# Patient Record
Sex: FEMALE | Race: WHITE | Employment: OTHER | URBAN - METROPOLITAN AREA
[De-identification: names, ages, dates, MRNs, and addresses within clinical notes are randomized per-mention and may not be internally consistent; named-entity substitution may affect disease eponyms.]

---

## 2019-07-15 ENCOUNTER — APPOINTMENT (OUTPATIENT)
Dept: RADIOLOGY | Facility: MEDICAL CENTER | Age: 64
DRG: 603 | End: 2019-07-15
Attending: EMERGENCY MEDICINE
Payer: MEDICARE

## 2019-07-15 ENCOUNTER — APPOINTMENT (OUTPATIENT)
Dept: RADIOLOGY | Facility: MEDICAL CENTER | Age: 64
DRG: 603 | End: 2019-07-15
Attending: HOSPITALIST
Payer: MEDICARE

## 2019-07-15 ENCOUNTER — HOSPITAL ENCOUNTER (INPATIENT)
Facility: MEDICAL CENTER | Age: 64
LOS: 2 days | DRG: 603 | End: 2019-07-17
Attending: EMERGENCY MEDICINE | Admitting: HOSPITALIST
Payer: MEDICARE

## 2019-07-15 DIAGNOSIS — L03.115 CELLULITIS OF RIGHT LOWER EXTREMITY: ICD-10-CM

## 2019-07-15 LAB
ALBUMIN SERPL BCP-MCNC: 4.1 G/DL (ref 3.2–4.9)
ALBUMIN/GLOB SERPL: 1.1 G/DL
ALP SERPL-CCNC: 28 U/L (ref 30–99)
ALT SERPL-CCNC: 14 U/L (ref 2–50)
ANION GAP SERPL CALC-SCNC: 9 MMOL/L (ref 0–11.9)
ANISOCYTOSIS BLD QL SMEAR: ABNORMAL
AST SERPL-CCNC: 17 U/L (ref 12–45)
BASOPHILS # BLD AUTO: 0.5 % (ref 0–1.8)
BASOPHILS # BLD: 0.07 K/UL (ref 0–0.12)
BILIRUB SERPL-MCNC: 0.4 MG/DL (ref 0.1–1.5)
BUN SERPL-MCNC: 18 MG/DL (ref 8–22)
BURR CELLS BLD QL SMEAR: NORMAL
CALCIUM SERPL-MCNC: 9.3 MG/DL (ref 8.5–10.5)
CHLORIDE SERPL-SCNC: 104 MMOL/L (ref 96–112)
CO2 SERPL-SCNC: 25 MMOL/L (ref 20–33)
COMMENT 1642: NORMAL
CREAT SERPL-MCNC: 1.07 MG/DL (ref 0.5–1.4)
EOSINOPHIL # BLD AUTO: 0.19 K/UL (ref 0–0.51)
EOSINOPHIL NFR BLD: 1.3 % (ref 0–6.9)
ERYTHROCYTE [DISTWIDTH] IN BLOOD BY AUTOMATED COUNT: 52.6 FL (ref 35.9–50)
GLOBULIN SER CALC-MCNC: 3.7 G/DL (ref 1.9–3.5)
GLUCOSE SERPL-MCNC: 112 MG/DL (ref 65–99)
HCT VFR BLD AUTO: 42.3 % (ref 37–47)
HGB BLD-MCNC: 12.6 G/DL (ref 12–16)
IMM GRANULOCYTES # BLD AUTO: 0.12 K/UL (ref 0–0.11)
IMM GRANULOCYTES NFR BLD AUTO: 0.8 % (ref 0–0.9)
INR PPP: 1.88 (ref 0.87–1.13)
LACTATE BLD-SCNC: 1.8 MMOL/L (ref 0.5–2)
LYMPHOCYTES # BLD AUTO: 1.08 K/UL (ref 1–4.8)
LYMPHOCYTES NFR BLD: 7.5 % (ref 22–41)
MAGNESIUM SERPL-MCNC: 1.6 MG/DL (ref 1.5–2.5)
MCH RBC QN AUTO: 25 PG (ref 27–33)
MCHC RBC AUTO-ENTMCNC: 29.8 G/DL (ref 33.6–35)
MCV RBC AUTO: 83.8 FL (ref 81.4–97.8)
MICROCYTES BLD QL SMEAR: ABNORMAL
MONOCYTES # BLD AUTO: 0.99 K/UL (ref 0–0.85)
MONOCYTES NFR BLD AUTO: 6.9 % (ref 0–13.4)
MORPHOLOGY BLD-IMP: NORMAL
NEUTROPHILS # BLD AUTO: 11.89 K/UL (ref 2–7.15)
NEUTROPHILS NFR BLD: 83 % (ref 44–72)
NRBC # BLD AUTO: 0 K/UL
NRBC BLD-RTO: 0 /100 WBC
PHOSPHATE SERPL-MCNC: 3.3 MG/DL (ref 2.5–4.5)
PLATELET # BLD AUTO: 317 K/UL (ref 164–446)
PLATELET BLD QL SMEAR: NORMAL
PMV BLD AUTO: 9.5 FL (ref 9–12.9)
POIKILOCYTOSIS BLD QL SMEAR: NORMAL
POTASSIUM SERPL-SCNC: 4.2 MMOL/L (ref 3.6–5.5)
PROT SERPL-MCNC: 7.8 G/DL (ref 6–8.2)
PROTHROMBIN TIME: 22.2 SEC (ref 12–14.6)
RBC # BLD AUTO: 5.05 M/UL (ref 4.2–5.4)
RBC BLD AUTO: PRESENT
SODIUM SERPL-SCNC: 138 MMOL/L (ref 135–145)
WBC # BLD AUTO: 14.3 K/UL (ref 4.8–10.8)

## 2019-07-15 PROCEDURE — 96376 TX/PRO/DX INJ SAME DRUG ADON: CPT

## 2019-07-15 PROCEDURE — 85025 COMPLETE CBC W/AUTO DIFF WBC: CPT

## 2019-07-15 PROCEDURE — 700105 HCHG RX REV CODE 258

## 2019-07-15 PROCEDURE — 85610 PROTHROMBIN TIME: CPT

## 2019-07-15 PROCEDURE — 96365 THER/PROPH/DIAG IV INF INIT: CPT

## 2019-07-15 PROCEDURE — 99285 EMERGENCY DEPT VISIT HI MDM: CPT

## 2019-07-15 PROCEDURE — 83735 ASSAY OF MAGNESIUM: CPT

## 2019-07-15 PROCEDURE — A9270 NON-COVERED ITEM OR SERVICE: HCPCS | Performed by: HOSPITALIST

## 2019-07-15 PROCEDURE — 83605 ASSAY OF LACTIC ACID: CPT

## 2019-07-15 PROCEDURE — 93971 EXTREMITY STUDY: CPT | Mod: RT

## 2019-07-15 PROCEDURE — 700105 HCHG RX REV CODE 258: Performed by: EMERGENCY MEDICINE

## 2019-07-15 PROCEDURE — 80053 COMPREHEN METABOLIC PANEL: CPT

## 2019-07-15 PROCEDURE — 304561 HCHG STAT O2

## 2019-07-15 PROCEDURE — 84100 ASSAY OF PHOSPHORUS: CPT

## 2019-07-15 PROCEDURE — 700111 HCHG RX REV CODE 636 W/ 250 OVERRIDE (IP): Performed by: HOSPITALIST

## 2019-07-15 PROCEDURE — 700111 HCHG RX REV CODE 636 W/ 250 OVERRIDE (IP)

## 2019-07-15 PROCEDURE — 700102 HCHG RX REV CODE 250 W/ 637 OVERRIDE(OP): Performed by: HOSPITALIST

## 2019-07-15 PROCEDURE — 700105 HCHG RX REV CODE 258: Performed by: HOSPITALIST

## 2019-07-15 PROCEDURE — 87040 BLOOD CULTURE FOR BACTERIA: CPT

## 2019-07-15 PROCEDURE — 73590 X-RAY EXAM OF LOWER LEG: CPT | Mod: RT

## 2019-07-15 PROCEDURE — 99223 1ST HOSP IP/OBS HIGH 75: CPT | Performed by: HOSPITALIST

## 2019-07-15 PROCEDURE — 71045 X-RAY EXAM CHEST 1 VIEW: CPT

## 2019-07-15 PROCEDURE — 73560 X-RAY EXAM OF KNEE 1 OR 2: CPT | Mod: LT

## 2019-07-15 PROCEDURE — 770006 HCHG ROOM/CARE - MED/SURG/GYN SEMI*

## 2019-07-15 PROCEDURE — 700111 HCHG RX REV CODE 636 W/ 250 OVERRIDE (IP): Performed by: EMERGENCY MEDICINE

## 2019-07-15 PROCEDURE — 96375 TX/PRO/DX INJ NEW DRUG ADDON: CPT

## 2019-07-15 RX ORDER — MIRTAZAPINE 15 MG/1
7.5 TABLET, FILM COATED ORAL
Status: ON HOLD | COMMUNITY
End: 2022-06-08

## 2019-07-15 RX ORDER — PROPRANOLOL HCL 60 MG
60 CAPSULE, EXTENDED RELEASE 24HR ORAL DAILY
Status: ON HOLD | COMMUNITY
End: 2022-06-06

## 2019-07-15 RX ORDER — ISOSORBIDE MONONITRATE 30 MG/1
30 TABLET, EXTENDED RELEASE ORAL DAILY
Status: DISCONTINUED | OUTPATIENT
Start: 2019-07-16 | End: 2019-07-17 | Stop reason: HOSPADM

## 2019-07-15 RX ORDER — ARIPIPRAZOLE 10 MG/1
10 TABLET ORAL DAILY
Status: DISCONTINUED | OUTPATIENT
Start: 2019-07-16 | End: 2019-07-17 | Stop reason: HOSPADM

## 2019-07-15 RX ORDER — ONDANSETRON 2 MG/ML
4 INJECTION INTRAMUSCULAR; INTRAVENOUS ONCE
Status: COMPLETED | OUTPATIENT
Start: 2019-07-15 | End: 2019-07-15

## 2019-07-15 RX ORDER — SERTRALINE HYDROCHLORIDE 100 MG/1
100 TABLET, FILM COATED ORAL DAILY
Status: DISCONTINUED | OUTPATIENT
Start: 2019-07-16 | End: 2019-07-17 | Stop reason: HOSPADM

## 2019-07-15 RX ORDER — KETOROLAC TROMETHAMINE 30 MG/ML
10 INJECTION, SOLUTION INTRAMUSCULAR; INTRAVENOUS ONCE
Status: COMPLETED | OUTPATIENT
Start: 2019-07-15 | End: 2019-07-15

## 2019-07-15 RX ORDER — FLUTICASONE PROPIONATE 50 MCG
1 SPRAY, SUSPENSION (ML) NASAL DAILY
Status: DISCONTINUED | OUTPATIENT
Start: 2019-07-16 | End: 2019-07-17 | Stop reason: HOSPADM

## 2019-07-15 RX ORDER — POTASSIUM CHLORIDE 20 MEQ/1
20 TABLET, EXTENDED RELEASE ORAL DAILY
Status: DISCONTINUED | OUTPATIENT
Start: 2019-07-16 | End: 2019-07-16

## 2019-07-15 RX ORDER — DOXYCYCLINE 100 MG/1
100 TABLET ORAL EVERY 12 HOURS
Status: DISCONTINUED | OUTPATIENT
Start: 2019-07-15 | End: 2019-07-17 | Stop reason: HOSPADM

## 2019-07-15 RX ORDER — POTASSIUM CHLORIDE 20 MEQ/1
20 TABLET, EXTENDED RELEASE ORAL DAILY
Status: DISCONTINUED | OUTPATIENT
Start: 2019-07-16 | End: 2019-07-15

## 2019-07-15 RX ORDER — ONDANSETRON 2 MG/ML
4 INJECTION INTRAMUSCULAR; INTRAVENOUS EVERY 4 HOURS PRN
Status: DISCONTINUED | OUTPATIENT
Start: 2019-07-15 | End: 2019-07-17 | Stop reason: HOSPADM

## 2019-07-15 RX ORDER — ACETAMINOPHEN 325 MG/1
650 TABLET ORAL EVERY 6 HOURS PRN
Status: DISCONTINUED | OUTPATIENT
Start: 2019-07-15 | End: 2019-07-17 | Stop reason: HOSPADM

## 2019-07-15 RX ORDER — OXYCODONE AND ACETAMINOPHEN 10; 325 MG/1; MG/1
1 TABLET ORAL EVERY 6 HOURS PRN
COMMUNITY

## 2019-07-15 RX ORDER — ARIPIPRAZOLE 10 MG/1
10 TABLET ORAL DAILY
COMMUNITY
End: 2022-10-20

## 2019-07-15 RX ORDER — WARFARIN SODIUM 2 MG/1
2 TABLET ORAL EVERY EVENING
Status: ON HOLD | COMMUNITY
End: 2022-06-08

## 2019-07-15 RX ORDER — FLUTICASONE PROPIONATE 50 MCG
1 SPRAY, SUSPENSION (ML) NASAL DAILY
Status: ON HOLD | COMMUNITY
End: 2022-06-06

## 2019-07-15 RX ORDER — PROMETHAZINE HYDROCHLORIDE 12.5 MG/1
12.5-25 SUPPOSITORY RECTAL EVERY 4 HOURS PRN
Status: DISCONTINUED | OUTPATIENT
Start: 2019-07-15 | End: 2019-07-17 | Stop reason: HOSPADM

## 2019-07-15 RX ORDER — POLYETHYLENE GLYCOL 3350 17 G/17G
1 POWDER, FOR SOLUTION ORAL
Status: DISCONTINUED | OUTPATIENT
Start: 2019-07-15 | End: 2019-07-17 | Stop reason: HOSPADM

## 2019-07-15 RX ORDER — BUDESONIDE AND FORMOTEROL FUMARATE DIHYDRATE 160; 4.5 UG/1; UG/1
2 AEROSOL RESPIRATORY (INHALATION) 2 TIMES DAILY
Status: DISCONTINUED | OUTPATIENT
Start: 2019-07-15 | End: 2019-07-17 | Stop reason: HOSPADM

## 2019-07-15 RX ORDER — FENUGREEK SEED/BL.THISTLE/ANIS 340 MG
1 CAPSULE ORAL EVERY EVENING
Status: DISCONTINUED | OUTPATIENT
Start: 2019-07-15 | End: 2019-07-15

## 2019-07-15 RX ORDER — LEVOTHYROXINE SODIUM 0.1 MG/1
100 TABLET ORAL DAILY
Status: DISCONTINUED | OUTPATIENT
Start: 2019-07-15 | End: 2019-07-17 | Stop reason: HOSPADM

## 2019-07-15 RX ORDER — FUROSEMIDE 40 MG/1
20 TABLET ORAL DAILY
Status: DISCONTINUED | OUTPATIENT
Start: 2019-07-15 | End: 2019-07-16

## 2019-07-15 RX ORDER — ONDANSETRON 4 MG/1
4 TABLET, ORALLY DISINTEGRATING ORAL
Status: ON HOLD | COMMUNITY
End: 2019-07-17

## 2019-07-15 RX ORDER — BENAZEPRIL HYDROCHLORIDE 10 MG/1
40 TABLET ORAL DAILY
Status: DISCONTINUED | OUTPATIENT
Start: 2019-07-15 | End: 2019-07-17 | Stop reason: HOSPADM

## 2019-07-15 RX ORDER — VITAMIN B COMPLEX
1 CAPSULE ORAL EVERY EVENING
Status: DISCONTINUED | OUTPATIENT
Start: 2019-07-15 | End: 2019-07-15

## 2019-07-15 RX ORDER — BISACODYL 10 MG
10 SUPPOSITORY, RECTAL RECTAL
Status: DISCONTINUED | OUTPATIENT
Start: 2019-07-15 | End: 2019-07-17 | Stop reason: HOSPADM

## 2019-07-15 RX ORDER — WARFARIN SODIUM 5 MG/1
5 TABLET ORAL
Status: COMPLETED | OUTPATIENT
Start: 2019-07-15 | End: 2019-07-15

## 2019-07-15 RX ORDER — ONDANSETRON 2 MG/ML
INJECTION INTRAMUSCULAR; INTRAVENOUS
Status: COMPLETED
Start: 2019-07-15 | End: 2019-07-15

## 2019-07-15 RX ORDER — ONDANSETRON 4 MG/1
4 TABLET, ORALLY DISINTEGRATING ORAL EVERY 4 HOURS PRN
Status: DISCONTINUED | OUTPATIENT
Start: 2019-07-15 | End: 2019-07-17 | Stop reason: HOSPADM

## 2019-07-15 RX ORDER — PANTOPRAZOLE SODIUM 40 MG/1
40 TABLET, DELAYED RELEASE ORAL DAILY
Status: DISCONTINUED | OUTPATIENT
Start: 2019-07-15 | End: 2019-07-15

## 2019-07-15 RX ORDER — OMEPRAZOLE 20 MG/1
20 CAPSULE, DELAYED RELEASE ORAL DAILY
Status: DISCONTINUED | OUTPATIENT
Start: 2019-07-16 | End: 2019-07-17 | Stop reason: HOSPADM

## 2019-07-15 RX ORDER — SODIUM CHLORIDE 9 MG/ML
INJECTION, SOLUTION INTRAVENOUS
Status: COMPLETED
Start: 2019-07-15 | End: 2019-07-15

## 2019-07-15 RX ORDER — PROMETHAZINE HYDROCHLORIDE 25 MG/1
12.5-25 TABLET ORAL EVERY 4 HOURS PRN
Status: DISCONTINUED | OUTPATIENT
Start: 2019-07-15 | End: 2019-07-17 | Stop reason: HOSPADM

## 2019-07-15 RX ORDER — OXYCODONE AND ACETAMINOPHEN 10; 325 MG/1; MG/1
1 TABLET ORAL EVERY 6 HOURS PRN
Status: DISCONTINUED | OUTPATIENT
Start: 2019-07-15 | End: 2019-07-16

## 2019-07-15 RX ORDER — MIRTAZAPINE 15 MG/1
15 TABLET, FILM COATED ORAL
Status: DISCONTINUED | OUTPATIENT
Start: 2019-07-15 | End: 2019-07-17 | Stop reason: HOSPADM

## 2019-07-15 RX ORDER — SERTRALINE HYDROCHLORIDE 100 MG/1
100 TABLET, FILM COATED ORAL DAILY
Status: ON HOLD | COMMUNITY
End: 2022-06-06

## 2019-07-15 RX ORDER — SIMVASTATIN 40 MG
20 TABLET ORAL NIGHTLY
Status: DISCONTINUED | OUTPATIENT
Start: 2019-07-15 | End: 2019-07-17 | Stop reason: HOSPADM

## 2019-07-15 RX ORDER — PANTOPRAZOLE SODIUM 40 MG/1
40 TABLET, DELAYED RELEASE ORAL DAILY
Status: ON HOLD | COMMUNITY
End: 2022-06-08

## 2019-07-15 RX ORDER — BUDESONIDE AND FORMOTEROL FUMARATE DIHYDRATE 160; 4.5 UG/1; UG/1
2 AEROSOL RESPIRATORY (INHALATION) 2 TIMES DAILY
Status: ON HOLD | COMMUNITY
End: 2022-06-06

## 2019-07-15 RX ORDER — AMOXICILLIN 250 MG
2 CAPSULE ORAL 2 TIMES DAILY
Status: DISCONTINUED | OUTPATIENT
Start: 2019-07-15 | End: 2019-07-17 | Stop reason: HOSPADM

## 2019-07-15 RX ORDER — PROPRANOLOL HCL 60 MG
60 CAPSULE, EXTENDED RELEASE 24HR ORAL DAILY
Status: DISCONTINUED | OUTPATIENT
Start: 2019-07-16 | End: 2019-07-17 | Stop reason: HOSPADM

## 2019-07-15 RX ADMIN — DOXYCYCLINE 100 MG: 100 TABLET, FILM COATED ORAL at 18:29

## 2019-07-15 RX ADMIN — WARFARIN SODIUM 5 MG: 5 TABLET ORAL at 19:19

## 2019-07-15 RX ADMIN — PROMETHAZINE HYDROCHLORIDE 25 MG: 25 TABLET ORAL at 22:06

## 2019-07-15 RX ADMIN — PIPERACILLIN AND TAZOBACTAM 3.38 G: 3; .375 INJECTION, POWDER, LYOPHILIZED, FOR SOLUTION INTRAVENOUS; PARENTERAL at 19:41

## 2019-07-15 RX ADMIN — AMPICILLIN AND SULBACTAM 3 G: 1; 2 INJECTION, POWDER, FOR SOLUTION INTRAMUSCULAR; INTRAVENOUS at 13:42

## 2019-07-15 RX ADMIN — ONDANSETRON 4 MG: 2 INJECTION INTRAMUSCULAR; INTRAVENOUS at 16:30

## 2019-07-15 RX ADMIN — MIRTAZAPINE 15 MG: 15 TABLET, FILM COATED ORAL at 19:39

## 2019-07-15 RX ADMIN — OXYCODONE AND ACETAMINOPHEN 1 TABLET: 10; 325 TABLET ORAL at 16:49

## 2019-07-15 RX ADMIN — ONDANSETRON 4 MG: 2 INJECTION INTRAMUSCULAR; INTRAVENOUS at 13:42

## 2019-07-15 RX ADMIN — PIPERACILLIN AND TAZOBACTAM 3.38 G: 3; .375 INJECTION, POWDER, LYOPHILIZED, FOR SOLUTION INTRAVENOUS; PARENTERAL at 18:29

## 2019-07-15 RX ADMIN — SIMVASTATIN 20 MG: 40 TABLET, FILM COATED ORAL at 19:39

## 2019-07-15 RX ADMIN — PROMETHAZINE HYDROCHLORIDE 25 MG: 25 TABLET ORAL at 18:29

## 2019-07-15 RX ADMIN — FENTANYL CITRATE 50 MCG: 0.05 INJECTION, SOLUTION INTRAMUSCULAR; INTRAVENOUS at 14:21

## 2019-07-15 RX ADMIN — METOPROLOL TARTRATE 25 MG: 25 TABLET, FILM COATED ORAL at 18:29

## 2019-07-15 RX ADMIN — SODIUM CHLORIDE 500 ML: 9 INJECTION, SOLUTION INTRAVENOUS at 18:30

## 2019-07-15 RX ADMIN — KETOROLAC TROMETHAMINE 9.99 MG: 30 INJECTION, SOLUTION INTRAMUSCULAR at 13:42

## 2019-07-15 RX ADMIN — OXYCODONE AND ACETAMINOPHEN 1 TABLET: 10; 325 TABLET ORAL at 22:06

## 2019-07-15 ASSESSMENT — ENCOUNTER SYMPTOMS
COUGH: 0
ORTHOPNEA: 0
POLYDIPSIA: 0
VOMITING: 1
HEMOPTYSIS: 0
DIZZINESS: 0
MYALGIAS: 1
TINGLING: 0
CHILLS: 0
WEAKNESS: 0
BLOOD IN STOOL: 0
SORE THROAT: 0
HEADACHES: 0
STRIDOR: 0
BLURRED VISION: 0
NERVOUS/ANXIOUS: 1
DEPRESSION: 0
CONSTIPATION: 0
BRUISES/BLEEDS EASILY: 0
FLANK PAIN: 0
SPUTUM PRODUCTION: 0
NECK PAIN: 0
NAUSEA: 1
HEARTBURN: 1
PND: 0
FALLS: 0
WHEEZING: 0
HALLUCINATIONS: 0
SINUS PAIN: 0
CLAUDICATION: 0
TREMORS: 0
ABDOMINAL PAIN: 0
PHOTOPHOBIA: 0
EYE PAIN: 0
DIAPHORESIS: 0
DOUBLE VISION: 0
SHORTNESS OF BREATH: 0
FEVER: 0
BACK PAIN: 0
PALPITATIONS: 0
DIARRHEA: 0

## 2019-07-15 ASSESSMENT — LIFESTYLE VARIABLES: SUBSTANCE_ABUSE: 0

## 2019-07-15 NOTE — H&P
Hospital Medicine History & Physical Note    Date of Service  7/15/2018    Primary Care Physician  Eliza Millard P.A.-C.    Consultants  None    Code Status  Full    Chief Complaint  Leg pain    History of Presenting Illness  64 y.o. female who presented 7/15/2019 with past medical history of obesity, asthma, DVT, hypertension, sleep apnea, diabetes comes to the emergency room for right foot pain that started this morning.  Patient woke up with his pain no severe rating down her legs.  Any pressure on the leg would cause pain.  Describes it as dull and constant.  Patient states that it started turning red but had no visible drainage.  She does state that the leg is itchy and she frequently scratches it.  Last week she had redness in her groin and that was resolved with nystatin powder.  This was associated with nausea and vomiting.  She denies any fever, chills, abdominal pain, chest pain, shortness of breath.  Upon arrival to emergency room with vital signs are within normal limits.  She was placed on 2 L of oxygen.  Right leg x-ray found no acute abnormalities or evidence of osteomyelitis  Bilateral venous Dopplers were negative  Chest x-ray interpreted by me found bilateral atelectasis with no focal infiltrates  Left knee x-ray found loose bodies and infusion      Review of Systems  Review of Systems   Constitutional: Negative for chills, diaphoresis, fever and malaise/fatigue.   HENT: Negative for congestion, ear discharge, ear pain, hearing loss, nosebleeds, sinus pain, sore throat and tinnitus.    Eyes: Negative for blurred vision, double vision, photophobia and pain.   Respiratory: Negative for cough, hemoptysis, sputum production, shortness of breath, wheezing and stridor.    Cardiovascular: Negative for chest pain, palpitations, orthopnea, claudication, leg swelling and PND.   Gastrointestinal: Positive for heartburn, nausea and vomiting. Negative for abdominal pain, blood in stool, constipation, diarrhea  and melena.   Genitourinary: Negative for dysuria, flank pain, frequency, hematuria and urgency.   Musculoskeletal: Positive for joint pain and myalgias. Negative for back pain, falls and neck pain.   Skin: Negative for itching and rash.   Neurological: Negative for dizziness, tingling, tremors, weakness and headaches.   Endo/Heme/Allergies: Negative for environmental allergies and polydipsia. Does not bruise/bleed easily.   Psychiatric/Behavioral: Negative for depression, hallucinations, substance abuse and suicidal ideas. The patient is nervous/anxious.        Past Medical History   has a past medical history of Angina; Anxiety; Arthritis; ASTHMA; Dental disorder; DVT (deep venous thrombosis) ([facility]); HTN (hypertension) (5/24/2011); Morbid obesity ([facility]) (5/24/2011); Psychiatric problem; Unspecified disorder of thyroid; and Unspecified urinary incontinence.    Surgical History   has a past surgical history that includes other abdominal surgery (1979); other abdominal surgery (1990); recovery (5/25/2011); and multiple coronary artery bypass endo vein harvest (5/26/2011).     Family History  family history includes Diabetes in her unknown relative; Heart Attack in her father and sister.     Social History   reports that she quit smoking about 43 years ago. Her smoking use included Cigarettes. She does not have any smokeless tobacco history on file. She reports that she does not drink alcohol or use drugs.    Allergies  Allergies   Allergen Reactions   • Strawberry Unspecified     Bumps on tongue and throat swelling   • Codeine Vomiting and Nausea       Medications  Prior to Admission Medications   Prescriptions Last Dose Informant Patient Reported? Taking?   ARIPiprazole (ABILIFY) 10 MG Tab 7/15/2019 at 0900  Yes Yes   Sig: Take 10 mg by mouth every day.   B Complex Vitamins (B COMPLEX PO) 7/14/2019 at 2000  Yes Yes   Sig: Take 1 Tab by mouth every evening.   BIOTIN PO 7/14/2019 at 2000  Yes Yes   Sig: Take 1 Tab by  mouth every evening.   BLACK COHOSH PO 7/14/2019 at 2000  Yes Yes   Sig: Take 1 Tab by mouth every evening.   Calcium-Vitamin D (CALCIUM + D PO) 7/14/2019 at 2000  Yes No   Sig: Take 1 Tab by mouth every evening.   Misc Natural Products (OSTEO BI-FLEX ADV DOUBLE ST PO) 7/15/2019 at 0900  Yes No   Sig: Take 1 Tab by mouth 2 Times a Day.   Multiple Vitamins-Minerals (ONE-A-DAY WOMENS 50 PLUS PO) 7/15/2019 at 0900  Yes No   Sig: Take 1 Tab by mouth every day.   Probiotic Product (PROBIOTIC PO) 7/14/2019 at 2000  Yes Yes   Sig: Take 1 Cap by mouth every evening.   aspirin 81 MG tablet 7/15/2019 at 0900  Yes No   Sig: Take 81 mg by mouth every day.   benazepril (LOTENSIN) 40 MG tablet 7/15/2019 at 0900  No No   Sig: Take 1 Tab by mouth every day. Needs follow up for future refills   fluticasone (FLONASE) 50 MCG/ACT nasal spray 7/15/2019 at 0900  Yes Yes   Sig: Spray 1 Spray in nose every day.   furosemide (LASIX) 20 MG TABS 7/15/2019 at 0900  Yes No   Sig: Take 20 mg by mouth every day.   isosorbide mononitrate SR (IMDUR) 30 MG TB24 7/15/2019 at 0900  No No   Sig: Take 1 Tab by mouth every day.   levothyroxine (SYNTHROID) 100 MCG Tab 7/15/2019 at 0900  Yes No   Sig: Take 100 mcg by mouth every day.   metoprolol (LOPRESSOR) 25 MG TABS 7/15/2019 at 0900  Yes No   Sig: Take 25 mg by mouth 2 times a day.   mirtazapine (REMERON) 15 MG Tab 7/14/2019 at 2000  Yes Yes   Sig: Take 15 mg by mouth every bedtime.   ondansetron (ZOFRAN ODT) 4 MG TABLET DISPERSIBLE <2 weeks at Unknown time  Yes Yes   Sig: Take 4 mg by mouth 1 time daily as needed for Nausea.   oxyCODONE-acetaminophen (PERCOCET-10)  MG Tab 7/14/2019 at 2200  Yes Yes   Sig: Take 1 Tab by mouth every 6 hours as needed for Severe Pain.   pantoprazole (PROTONIX) 40 MG Tablet Delayed Response 7/15/2019 at 0900  Yes Yes   Sig: Take 40 mg by mouth every day.   potassium chloride (KLOR-CON) 20 MEQ PACK 7/15/2019 at 0900  Yes No   Sig: Take 20 mEq by mouth every day.    propranolol LA (INDERAL LA) 60 MG CAPSULE SR 24 HR 7/15/2019 at 0900  Yes Yes   Sig: Take 60 mg by mouth every day.   sertraline (ZOLOFT) 100 MG Tab 7/15/2019 at 0900  Yes Yes   Sig: Take 100 mg by mouth every day.   simvastatin (ZOCOR) 20 MG TABS 7/14/2019 at 2000  Yes No   Sig: Take 20 mg by mouth every evening.   warfarin (COUMADIN) 3 MG Tab 7/14/2019 at 2000  Yes Yes   Sig: Take 3-4 mg by mouth every evening.      Facility-Administered Medications: None       Physical Exam  Temp:  [36.2 °C (97.1 °F)-37.1 °C (98.8 °F)] 36.3 °C (97.4 °F)  Pulse:  [62-83] 62  Resp:  [16-20] 18  BP: (102-127)/(34-61) 127/34  SpO2:  [91 %-98 %] 93 %    Physical Exam   Constitutional: She is oriented to person, place, and time. She appears well-developed and well-nourished.   HENT:   Head: Normocephalic and atraumatic.   Mouth/Throat: No oropharyngeal exudate.   Eyes: Pupils are equal, round, and reactive to light. EOM are normal. No scleral icterus.   Neck: Normal range of motion. Neck supple. JVD present. No tracheal deviation present. No thyromegaly present.   Cardiovascular: Normal rate, regular rhythm and intact distal pulses.  Exam reveals no gallop and no friction rub.    No murmur heard.  Pulmonary/Chest: Effort normal and breath sounds normal. No stridor. No respiratory distress. She has no wheezes. She has no rales. She exhibits no tenderness.   Abdominal: Soft. Bowel sounds are normal. She exhibits no distension and no mass. There is no tenderness. There is no rebound and no guarding.   Musculoskeletal: Normal range of motion. She exhibits edema.   Right leg redness, warmth, tenderness   Lymphadenopathy:     She has no cervical adenopathy.   Neurological: She is alert and oriented to person, place, and time. She has normal reflexes. No cranial nerve deficit.   Skin: No rash noted. No erythema. No pallor.   Nursing note and vitals reviewed.      Laboratory:  Recent Labs      07/15/19   1232   WBC  14.3*   RBC  5.05    HEMOGLOBIN  12.6   HEMATOCRIT  42.3   MCV  83.8   MCH  25.0*   MCHC  29.8*   RDW  52.6*   PLATELETCT  317   MPV  9.5     Recent Labs      07/15/19   1232   SODIUM  138   POTASSIUM  4.2   CHLORIDE  104   CO2  25   GLUCOSE  112*   BUN  18   CREATININE  1.07   CALCIUM  9.3     Recent Labs      07/15/19   1232   ALTSGPT  14   ASTSGOT  17   ALKPHOSPHAT  28*   TBILIRUBIN  0.4   GLUCOSE  112*     Recent Labs      07/15/19   1232   INR  1.88*             No results for input(s): TROPONINI in the last 72 hours.    Urinalysis:    No results found     Imaging:  DX-KNEE 2- LEFT   Final Result      Postsurgical changes status post left knee arthroplasty.      Degenerative changes.      Trace joint fluid.      Calcification of the distal quadriceps tendon can be seen with calcific tendinopathy.      Anterior loose body.            DX-CHEST-PORTABLE (1 VIEW)   Final Result      Stable cardiomegaly.      Hypoinflation.         US-EXTREMITY VENOUS LOWER UNILAT RIGHT   Final Result      DX-TIBIA AND FIBULA RIGHT   Final Result      No evidence of acute fracture or dislocation.      Prominent calcaneal spurring.      Post surgical changes status post right knee arthroplasty.               Assessment/Plan:  I anticipate this patient will require at least two midnights for appropriate medical management, necessitating inpatient admission.    * Cellulitis of right lower extremity   Assessment & Plan    Redness, warmth, tenderness  Nonseptic lactic acid was normal  Start on IV Zosyn and doxycycline  Follow blood cultures and resolution of redness     History of DVT (deep vein thrombosis)   Assessment & Plan    Continue warfarin with INR goal of 2-3  Current INR is 1.88  Venous Doppler ultrasound were negative     Pulmonary hypertension (HCC)   Assessment & Plan    Likely from obesity  Continue Lasix for volume control     Chronic pain of left knee   Assessment & Plan    X-ray found evidence of loose bodies and effusion  ESR CRP  pending  Ortho consult in a.m.     Hyperglycemia   Assessment & Plan    glucose below 150 in setting of infection  Follow-up on HbA1c level     LALITHA (acute kidney injury) (HCC)   Assessment & Plan    Likely prerenal  IV fluid hydration with normal saline  Monitor BMP and assess response  Avoid IV contrast/nephrotoxins/NSAIDs  Dose adjust meds for decreased GFR         Hypothyroid- (present on admission)   Assessment & Plan    Continue home levothyroxine  Follow-up TSH levels     Dyslipidemia- (present on admission)   Assessment & Plan    Continue current statin dose and follow-up lipid panel     CAD (coronary artery disease)- (present on admission)   Assessment & Plan    Continue aspirin  Continue current statin of 20 mg and follow-up on lipid panel     Morbid obesity (HCC)- (present on admission)   Assessment & Plan    Patient has been counseled on diet and lifestyle modifications  Recommended outpatient weight management program and bariatric surgery evaluation  Pt educated on the increase of morbidity and mortality associated with excess weight including DM, Heart Disease, HTN, stroke, and sleep apnea.  Pt advised weight loss of 5% through reduced calorie, low carb diet and 150 mins of exercise a week       HTN (hypertension)- (present on admission)   Assessment & Plan    Continue current medications including propanolol and metoprolol, Imdur  Patient states that she takes propranolol for tremors         VTE prophylaxis: Warfarin

## 2019-07-15 NOTE — ED PROVIDER NOTES
"ED Provider Note      CHIEF COMPLAINT   Chief Complaint   Patient presents with   • Leg Pain   • Leg Swelling     redness and \"lump\" to lower lateral leg       HPI   Johana Marx is a 64 y.o. female who presents with right leg pain redness and swelling.  Started right lateral lower leg 2 days ago.  Gradually worse.  Sharp character.  Severe when she touches it.  Moved up the side of the leg.  Has pain also in the back of the leg.  She has a history of DVT in the past in the same leg.  She has however anticoagulated with warfarin.  She has not noticed fever.  Has not known of any trauma.    REVIEW OF SYSTEMS   As per HPI, all other systems reviewed and negative    PAST MEDICAL HISTORY   Past Medical History:   Diagnosis Date   • Angina    • Anxiety    • Arthritis     ankles, back, shoulders, knees   • ASTHMA    • Dental disorder     dentures   • DVT (deep venous thrombosis) ([facility])    • HTN (hypertension) 5/24/2011   • Morbid obesity (HCC) 5/24/2011   • Psychiatric problem     depression   • Unspecified disorder of thyroid    • Unspecified urinary incontinence        SOCIAL HISTORY  Social History   Substance Use Topics   • Smoking status: Former Smoker     Types: Cigarettes     Quit date: 5/24/1976   • Smokeless tobacco: Not on file   • Alcohol use No       ALLERGIES   See chart    PHYSICAL EXAM  VITAL SIGNS: /61   Pulse 80   Temp 36.8 °C (98.2 °F) (Temporal)   Resp 16   Ht 1.6 m (5' 3\")   SpO2 92%   BMI 54.56 kg/m²   Head: Atraumatic  Eyes: Eyes normal inspection  Neck: has full range of motion, normal inspection.  Constitutional: No acute distress   Cardiovascular: Normal heart rate. Pulses strong dorsalis pedis  Thorax & Lungs: No respiratory distress  Skin: There is redness warmth and swelling over the right lateral and posterior aspect of the right lower leg.  No palpable cords.  Questionable varicose veins laterally.  No fluctuant abscess identified.  Musculoskeletal: As above.  Tender to " palpation over the area of redness and warmth.  Compartments soft.  No pain with range of motion of the knee or joint effusion.  Neurologic:  Normal sensory and motor    RADIOLOGY/PROCEDURES  US-EXTREMITY VENOUS LOWER UNILAT RIGHT   Final Result      DX-TIBIA AND FIBULA RIGHT   Final Result      No evidence of acute fracture or dislocation.      Prominent calcaneal spurring.      Post surgical changes status post right knee arthroplasty.         DX-KNEE 2- LEFT    (Results Pending)   DX-CHEST-PORTABLE (1 VIEW)    (Results Pending)      Imaging is interpreted by radiologist     Results for orders placed or performed during the hospital encounter of 07/15/19   LACTIC ACID   Result Value Ref Range    Lactic Acid 1.8 0.5 - 2.0 mmol/L   CBC WITH DIFFERENTIAL   Result Value Ref Range    WBC 14.3 (H) 4.8 - 10.8 K/uL    RBC 5.05 4.20 - 5.40 M/uL    Hemoglobin 12.6 12.0 - 16.0 g/dL    Hematocrit 42.3 37.0 - 47.0 %    MCV 83.8 81.4 - 97.8 fL    MCH 25.0 (L) 27.0 - 33.0 pg    MCHC 29.8 (L) 33.6 - 35.0 g/dL    RDW 52.6 (H) 35.9 - 50.0 fL    Platelet Count 317 164 - 446 K/uL    MPV 9.5 9.0 - 12.9 fL    Neutrophils-Polys 83.00 (H) 44.00 - 72.00 %    Lymphocytes 7.50 (L) 22.00 - 41.00 %    Monocytes 6.90 0.00 - 13.40 %    Eosinophils 1.30 0.00 - 6.90 %    Basophils 0.50 0.00 - 1.80 %    Immature Granulocytes 0.80 0.00 - 0.90 %    Nucleated RBC 0.00 /100 WBC    Neutrophils (Absolute) 11.89 (H) 2.00 - 7.15 K/uL    Lymphs (Absolute) 1.08 1.00 - 4.80 K/uL    Monos (Absolute) 0.99 (H) 0.00 - 0.85 K/uL    Eos (Absolute) 0.19 0.00 - 0.51 K/uL    Baso (Absolute) 0.07 0.00 - 0.12 K/uL    Immature Granulocytes (abs) 0.12 (H) 0.00 - 0.11 K/uL    NRBC (Absolute) 0.00 K/uL    Anisocytosis 1+     Microcytosis 1+    COMP METABOLIC PANEL   Result Value Ref Range    Sodium 138 135 - 145 mmol/L    Potassium 4.2 3.6 - 5.5 mmol/L    Chloride 104 96 - 112 mmol/L    Co2 25 20 - 33 mmol/L    Anion Gap 9.0 0.0 - 11.9    Glucose 112 (H) 65 - 99 mg/dL     Bun 18 8 - 22 mg/dL    Creatinine 1.07 0.50 - 1.40 mg/dL    Calcium 9.3 8.5 - 10.5 mg/dL    AST(SGOT) 17 12 - 45 U/L    ALT(SGPT) 14 2 - 50 U/L    Alkaline Phosphatase 28 (L) 30 - 99 U/L    Total Bilirubin 0.4 0.1 - 1.5 mg/dL    Albumin 4.1 3.2 - 4.9 g/dL    Total Protein 7.8 6.0 - 8.2 g/dL    Globulin 3.7 (H) 1.9 - 3.5 g/dL    A-G Ratio 1.1 g/dL   PROTHROMBIN TIME   Result Value Ref Range    PT 22.2 (H) 12.0 - 14.6 sec    INR 1.88 (H) 0.87 - 1.13   ESTIMATED GFR   Result Value Ref Range    GFR If African American >60 >60 mL/min/1.73 m 2    GFR If Non African American 52 (A) >60 mL/min/1.73 m 2   PERIPHERAL SMEAR REVIEW   Result Value Ref Range    Peripheral Smear Review see below    PLATELET ESTIMATE   Result Value Ref Range    Plt Estimation Normal    MORPHOLOGY   Result Value Ref Range    RBC Morphology Present     Poikilocytosis 1+     Echinocytes 1+    DIFFERENTIAL COMMENT   Result Value Ref Range    Comments-Diff see below         COURSE & MEDICAL DECISION MAKING  Patient presents to the ER with right leg pain and swelling.  Has redness warmth and induration over the right lateral leg without evidence of abscess.  Venous duplex was negative.  X-ray was negative.  Laboratory data demonstrates leukocytosis.  Suspect symptoms related to cellulitis.  She was given Unasyn venously.  She had significant pain.  Initially given Toradol.  Since currently given fentanyl.  She was nauseous and given Zofran.  Remained persistently nauseous.  Because of the degree infection as well as her symptoms she will be admitted for IV antibiotics.  I consulted Dr. Sousa for admission.    FINAL IMPRESSION  1.  Cellulitis, right lower extremity      This dictation was created using voice recognition software. The accuracy of the dictation is limited to the abilities of the software. I expect there may be some errors of grammar and possibly content. The nursing notes were reviewed and certain aspects of this information were  incorporated into this note.      Electronically signed by: Tk Sanchez, 7/15/2019 1:00 PM

## 2019-07-15 NOTE — ED TRIAGE NOTES
"Johana Marx  Chief Complaint   Patient presents with   • Leg Pain   • Leg Swelling     redness and \"lump\" to lower lateral leg     Pt to triage in w/c with above complaint.  VSS.  No acute distress noted.  Hx of DVTs, on warfarin.   Pt returned to lobby, educated on triage process, and to inform staff of any changes or concerns.     "

## 2019-07-15 NOTE — ED NOTES
Med rec complete per pt at bedside with list.   Med rec contacted pharmacy about recent fills of medications.  Interviewed pt with family at bedside with permission from pt  Allergies reviewed and updated.

## 2019-07-16 ENCOUNTER — APPOINTMENT (OUTPATIENT)
Dept: RADIOLOGY | Facility: MEDICAL CENTER | Age: 64
DRG: 603 | End: 2019-07-16
Attending: HOSPITALIST
Payer: MEDICARE

## 2019-07-16 PROBLEM — G89.29 CHRONIC PAIN OF LEFT KNEE: Status: ACTIVE | Noted: 2019-07-16

## 2019-07-16 PROBLEM — R73.9 HYPERGLYCEMIA: Status: ACTIVE | Noted: 2019-07-16

## 2019-07-16 PROBLEM — I27.20 PULMONARY HYPERTENSION (HCC): Status: ACTIVE | Noted: 2019-07-16

## 2019-07-16 PROBLEM — R11.0 NAUSEA: Status: ACTIVE | Noted: 2019-07-16

## 2019-07-16 PROBLEM — M25.562 CHRONIC PAIN OF LEFT KNEE: Status: ACTIVE | Noted: 2019-07-16

## 2019-07-16 PROBLEM — Z86.718 HISTORY OF DVT (DEEP VEIN THROMBOSIS): Status: ACTIVE | Noted: 2019-07-16

## 2019-07-16 PROBLEM — N17.9 AKI (ACUTE KIDNEY INJURY) (HCC): Status: ACTIVE | Noted: 2019-07-16

## 2019-07-16 LAB
ALBUMIN SERPL BCP-MCNC: 3.4 G/DL (ref 3.2–4.9)
ALBUMIN/GLOB SERPL: 1 G/DL
ALP SERPL-CCNC: 26 U/L (ref 30–99)
ALT SERPL-CCNC: 11 U/L (ref 2–50)
ANION GAP SERPL CALC-SCNC: 9 MMOL/L (ref 0–11.9)
AST SERPL-CCNC: 15 U/L (ref 12–45)
BASOPHILS # BLD AUTO: 0.5 % (ref 0–1.8)
BASOPHILS # BLD: 0.05 K/UL (ref 0–0.12)
BILIRUB SERPL-MCNC: 0.3 MG/DL (ref 0.1–1.5)
BUN SERPL-MCNC: 26 MG/DL (ref 8–22)
CALCIUM SERPL-MCNC: 8.9 MG/DL (ref 8.5–10.5)
CHLORIDE SERPL-SCNC: 102 MMOL/L (ref 96–112)
CHOLEST SERPL-MCNC: 106 MG/DL (ref 100–199)
CO2 SERPL-SCNC: 24 MMOL/L (ref 20–33)
CREAT SERPL-MCNC: 1.43 MG/DL (ref 0.5–1.4)
CRP SERPL HS-MCNC: 9.19 MG/DL (ref 0–0.75)
EOSINOPHIL # BLD AUTO: 0.26 K/UL (ref 0–0.51)
EOSINOPHIL NFR BLD: 2.4 % (ref 0–6.9)
ERYTHROCYTE [DISTWIDTH] IN BLOOD BY AUTOMATED COUNT: 53.6 FL (ref 35.9–50)
ERYTHROCYTE [SEDIMENTATION RATE] IN BLOOD BY WESTERGREN METHOD: 85 MM/HOUR (ref 0–30)
EST. AVERAGE GLUCOSE BLD GHB EST-MCNC: 123 MG/DL
GLOBULIN SER CALC-MCNC: 3.3 G/DL (ref 1.9–3.5)
GLUCOSE SERPL-MCNC: 114 MG/DL (ref 65–99)
HBA1C MFR BLD: 5.9 % (ref 0–5.6)
HCT VFR BLD AUTO: 36.4 % (ref 37–47)
HDLC SERPL-MCNC: 43 MG/DL
HGB BLD-MCNC: 11.3 G/DL (ref 12–16)
IMM GRANULOCYTES # BLD AUTO: 0.09 K/UL (ref 0–0.11)
IMM GRANULOCYTES NFR BLD AUTO: 0.8 % (ref 0–0.9)
INR PPP: 1.93 (ref 0.87–1.13)
LDLC SERPL CALC-MCNC: 46 MG/DL
LIPASE SERPL-CCNC: 6 U/L (ref 11–82)
LYMPHOCYTES # BLD AUTO: 1.17 K/UL (ref 1–4.8)
LYMPHOCYTES NFR BLD: 10.9 % (ref 22–41)
MCH RBC QN AUTO: 26.2 PG (ref 27–33)
MCHC RBC AUTO-ENTMCNC: 31 G/DL (ref 33.6–35)
MCV RBC AUTO: 84.3 FL (ref 81.4–97.8)
MONOCYTES # BLD AUTO: 0.88 K/UL (ref 0–0.85)
MONOCYTES NFR BLD AUTO: 8.2 % (ref 0–13.4)
NEUTROPHILS # BLD AUTO: 8.26 K/UL (ref 2–7.15)
NEUTROPHILS NFR BLD: 77.2 % (ref 44–72)
NRBC # BLD AUTO: 0 K/UL
NRBC BLD-RTO: 0 /100 WBC
PLATELET # BLD AUTO: 228 K/UL (ref 164–446)
PMV BLD AUTO: 9.4 FL (ref 9–12.9)
POTASSIUM SERPL-SCNC: 4.1 MMOL/L (ref 3.6–5.5)
PROT SERPL-MCNC: 6.7 G/DL (ref 6–8.2)
PROTHROMBIN TIME: 22.6 SEC (ref 12–14.6)
RBC # BLD AUTO: 4.32 M/UL (ref 4.2–5.4)
SODIUM SERPL-SCNC: 135 MMOL/L (ref 135–145)
TRIGL SERPL-MCNC: 84 MG/DL (ref 0–149)
TSH SERPL DL<=0.005 MIU/L-ACNC: 0.7 UIU/ML (ref 0.38–5.33)
WBC # BLD AUTO: 10.7 K/UL (ref 4.8–10.8)

## 2019-07-16 PROCEDURE — 86140 C-REACTIVE PROTEIN: CPT

## 2019-07-16 PROCEDURE — 36415 COLL VENOUS BLD VENIPUNCTURE: CPT

## 2019-07-16 PROCEDURE — 700102 HCHG RX REV CODE 250 W/ 637 OVERRIDE(OP): Performed by: HOSPITALIST

## 2019-07-16 PROCEDURE — 74019 RADEX ABDOMEN 2 VIEWS: CPT

## 2019-07-16 PROCEDURE — 84443 ASSAY THYROID STIM HORMONE: CPT

## 2019-07-16 PROCEDURE — 700111 HCHG RX REV CODE 636 W/ 250 OVERRIDE (IP): Performed by: HOSPITALIST

## 2019-07-16 PROCEDURE — 94760 N-INVAS EAR/PLS OXIMETRY 1: CPT

## 2019-07-16 PROCEDURE — 85610 PROTHROMBIN TIME: CPT

## 2019-07-16 PROCEDURE — 85025 COMPLETE CBC W/AUTO DIFF WBC: CPT

## 2019-07-16 PROCEDURE — 700105 HCHG RX REV CODE 258: Performed by: HOSPITALIST

## 2019-07-16 PROCEDURE — 80053 COMPREHEN METABOLIC PANEL: CPT

## 2019-07-16 PROCEDURE — A9270 NON-COVERED ITEM OR SERVICE: HCPCS | Performed by: HOSPITALIST

## 2019-07-16 PROCEDURE — 85652 RBC SED RATE AUTOMATED: CPT

## 2019-07-16 PROCEDURE — 83690 ASSAY OF LIPASE: CPT

## 2019-07-16 PROCEDURE — 700105 HCHG RX REV CODE 258: Performed by: NURSE PRACTITIONER

## 2019-07-16 PROCEDURE — 99232 SBSQ HOSP IP/OBS MODERATE 35: CPT | Performed by: HOSPITALIST

## 2019-07-16 PROCEDURE — 770006 HCHG ROOM/CARE - MED/SURG/GYN SEMI*

## 2019-07-16 PROCEDURE — 80061 LIPID PANEL: CPT

## 2019-07-16 PROCEDURE — 83036 HEMOGLOBIN GLYCOSYLATED A1C: CPT

## 2019-07-16 RX ORDER — MORPHINE SULFATE 4 MG/ML
2 INJECTION, SOLUTION INTRAMUSCULAR; INTRAVENOUS ONCE
Status: COMPLETED | OUTPATIENT
Start: 2019-07-16 | End: 2019-07-16

## 2019-07-16 RX ORDER — SODIUM CHLORIDE 9 MG/ML
INJECTION, SOLUTION INTRAVENOUS CONTINUOUS
Status: DISCONTINUED | OUTPATIENT
Start: 2019-07-16 | End: 2019-07-16

## 2019-07-16 RX ORDER — WARFARIN SODIUM 4 MG/1
4 TABLET ORAL
Status: DISCONTINUED | OUTPATIENT
Start: 2019-07-16 | End: 2019-07-17 | Stop reason: HOSPADM

## 2019-07-16 RX ORDER — SODIUM CHLORIDE 9 MG/ML
1000 INJECTION, SOLUTION INTRAVENOUS CONTINUOUS
Status: DISCONTINUED | OUTPATIENT
Start: 2019-07-16 | End: 2019-07-17 | Stop reason: HOSPADM

## 2019-07-16 RX ORDER — OXYCODONE AND ACETAMINOPHEN 10; 325 MG/1; MG/1
1 TABLET ORAL EVERY 4 HOURS PRN
Status: DISCONTINUED | OUTPATIENT
Start: 2019-07-16 | End: 2019-07-17 | Stop reason: HOSPADM

## 2019-07-16 RX ADMIN — BENAZEPRIL HYDROCHLORIDE 40 MG: 10 TABLET ORAL at 05:49

## 2019-07-16 RX ADMIN — SERTRALINE HYDROCHLORIDE 100 MG: 100 TABLET ORAL at 05:51

## 2019-07-16 RX ADMIN — LEVOTHYROXINE SODIUM 100 MCG: 100 TABLET ORAL at 05:52

## 2019-07-16 RX ADMIN — POTASSIUM CHLORIDE 20 MEQ: 1500 TABLET, EXTENDED RELEASE ORAL at 05:53

## 2019-07-16 RX ADMIN — ARIPIPRAZOLE 10 MG: 10 TABLET ORAL at 05:48

## 2019-07-16 RX ADMIN — MORPHINE SULFATE 2 MG: 4 INJECTION INTRAVENOUS at 01:26

## 2019-07-16 RX ADMIN — PROMETHAZINE HYDROCHLORIDE 25 MG: 25 TABLET ORAL at 05:47

## 2019-07-16 RX ADMIN — OXYCODONE HYDROCHLORIDE AND ACETAMINOPHEN 1 TABLET: 10; 325 TABLET ORAL at 20:29

## 2019-07-16 RX ADMIN — PIPERACILLIN AND TAZOBACTAM 3.38 G: 3; .375 INJECTION, POWDER, LYOPHILIZED, FOR SOLUTION INTRAVENOUS; PARENTERAL at 05:48

## 2019-07-16 RX ADMIN — FUROSEMIDE 20 MG: 40 TABLET ORAL at 05:52

## 2019-07-16 RX ADMIN — MIRTAZAPINE 15 MG: 15 TABLET, FILM COATED ORAL at 20:29

## 2019-07-16 RX ADMIN — SENNOSIDES, DOCUSATE SODIUM 2 TABLET: 50; 8.6 TABLET, FILM COATED ORAL at 16:40

## 2019-07-16 RX ADMIN — PIPERACILLIN AND TAZOBACTAM 3.38 G: 3; .375 INJECTION, POWDER, LYOPHILIZED, FOR SOLUTION INTRAVENOUS; PARENTERAL at 14:03

## 2019-07-16 RX ADMIN — SENNOSIDES, DOCUSATE SODIUM 2 TABLET: 50; 8.6 TABLET, FILM COATED ORAL at 05:53

## 2019-07-16 RX ADMIN — METOPROLOL TARTRATE 25 MG: 25 TABLET, FILM COATED ORAL at 05:48

## 2019-07-16 RX ADMIN — ACETAMINOPHEN 650 MG: 325 TABLET, FILM COATED ORAL at 08:35

## 2019-07-16 RX ADMIN — BUDESONIDE AND FORMOTEROL FUMARATE DIHYDRATE 2 PUFF: 160; 4.5 AEROSOL RESPIRATORY (INHALATION) at 16:40

## 2019-07-16 RX ADMIN — PROPRANOLOL HYDROCHLORIDE 60 MG: 60 CAPSULE, EXTENDED RELEASE ORAL at 05:52

## 2019-07-16 RX ADMIN — ACETAMINOPHEN 650 MG: 325 TABLET, FILM COATED ORAL at 00:57

## 2019-07-16 RX ADMIN — SIMVASTATIN 20 MG: 40 TABLET, FILM COATED ORAL at 20:30

## 2019-07-16 RX ADMIN — DOXYCYCLINE 100 MG: 100 TABLET, FILM COATED ORAL at 16:40

## 2019-07-16 RX ADMIN — ACETAMINOPHEN 650 MG: 325 TABLET, FILM COATED ORAL at 18:20

## 2019-07-16 RX ADMIN — OXYCODONE HYDROCHLORIDE AND ACETAMINOPHEN 1 TABLET: 10; 325 TABLET ORAL at 11:24

## 2019-07-16 RX ADMIN — SODIUM CHLORIDE 1000 ML: 9 INJECTION, SOLUTION INTRAVENOUS at 16:41

## 2019-07-16 RX ADMIN — DOXYCYCLINE 100 MG: 100 TABLET, FILM COATED ORAL at 05:52

## 2019-07-16 RX ADMIN — OXYCODONE AND ACETAMINOPHEN 1 TABLET: 10; 325 TABLET ORAL at 05:47

## 2019-07-16 RX ADMIN — WARFARIN SODIUM 4 MG: 4 TABLET ORAL at 18:20

## 2019-07-16 RX ADMIN — PROMETHAZINE HYDROCHLORIDE 25 MG: 25 TABLET ORAL at 10:06

## 2019-07-16 RX ADMIN — PIPERACILLIN AND TAZOBACTAM 3.38 G: 3; .375 INJECTION, POWDER, LYOPHILIZED, FOR SOLUTION INTRAVENOUS; PARENTERAL at 20:29

## 2019-07-16 RX ADMIN — PROMETHAZINE HYDROCHLORIDE 25 MG: 25 TABLET ORAL at 16:40

## 2019-07-16 RX ADMIN — ISOSORBIDE MONONITRATE 30 MG: 30 TABLET, EXTENDED RELEASE ORAL at 05:48

## 2019-07-16 RX ADMIN — OMEPRAZOLE 20 MG: 20 CAPSULE, DELAYED RELEASE ORAL at 05:51

## 2019-07-16 RX ADMIN — FLUTICASONE PROPIONATE 50 MCG: 50 SPRAY, METERED NASAL at 05:48

## 2019-07-16 RX ADMIN — ASPIRIN 81 MG: 81 TABLET, COATED ORAL at 05:51

## 2019-07-16 RX ADMIN — BUDESONIDE AND FORMOTEROL FUMARATE DIHYDRATE 2 PUFF: 160; 4.5 AEROSOL RESPIRATORY (INHALATION) at 05:48

## 2019-07-16 ASSESSMENT — ENCOUNTER SYMPTOMS
WEAKNESS: 0
ABDOMINAL PAIN: 0
NAUSEA: 1
DOUBLE VISION: 0
HEADACHES: 0
FEVER: 0
DIZZINESS: 0
PALPITATIONS: 0
CONSTIPATION: 0
COUGH: 0
VOMITING: 0
DIARRHEA: 0
TINGLING: 0
SPEECH CHANGE: 0
CHILLS: 0
SHORTNESS OF BREATH: 0
BLURRED VISION: 0
SENSORY CHANGE: 0
TREMORS: 0
MYALGIAS: 1

## 2019-07-16 ASSESSMENT — COGNITIVE AND FUNCTIONAL STATUS - GENERAL
CLIMB 3 TO 5 STEPS WITH RAILING: A LITTLE
DAILY ACTIVITIY SCORE: 20
DRESSING REGULAR UPPER BODY CLOTHING: A LITTLE
DRESSING REGULAR LOWER BODY CLOTHING: A LITTLE
WALKING IN HOSPITAL ROOM: A LITTLE
TOILETING: A LITTLE
MOBILITY SCORE: 20
SUGGESTED CMS G CODE MODIFIER MOBILITY: CJ
MOVING FROM LYING ON BACK TO SITTING ON SIDE OF FLAT BED: A LITTLE
SUGGESTED CMS G CODE MODIFIER DAILY ACTIVITY: CJ
STANDING UP FROM CHAIR USING ARMS: A LITTLE
HELP NEEDED FOR BATHING: A LITTLE

## 2019-07-16 ASSESSMENT — COPD QUESTIONNAIRES
DO YOU EVER COUGH UP ANY MUCUS OR PHLEGM?: NO/ONLY WITH OCCASIONAL COLDS OR INFECTIONS
COPD SCREENING SCORE: 4
DURING THE PAST 4 WEEKS HOW MUCH DID YOU FEEL SHORT OF BREATH: NONE/LITTLE OF THE TIME
HAVE YOU SMOKED AT LEAST 100 CIGARETTES IN YOUR ENTIRE LIFE: YES

## 2019-07-16 ASSESSMENT — LIFESTYLE VARIABLES: EVER_SMOKED: YES

## 2019-07-16 NOTE — CARE PLAN
Problem: Communication  Goal: The ability to communicate needs accurately and effectively will improve  Outcome: PROGRESSING AS EXPECTED  The pt uses her call light appropriately.     Problem: Knowledge Deficit  Goal: Knowledge of disease process/condition, treatment plan, diagnostic tests, and medications will improve  Outcome: PROGRESSING AS EXPECTED  The pt and family are educated on the plan of care, treatments and medications.

## 2019-07-16 NOTE — DIETARY
"NUTRITION SERVICES: BMI - Pt with BMI >40 (=Body mass index is 50.08 kg/m².), morbid obesity. Weight loss counseling not appropriate in acute care setting.     Consult received for nutrition consult. Per MD note \"Pt educated on the increase of morbidity and mortality associated with excess weight including DM, Heart Disease, HTN, stroke, and sleep apnea.  Pt advised weight loss of 5% through reduced calorie, low carb diet and 150 mins of exercise a week\". Spoke to pt. Pt declined nutrition education on healthy diet and wt loss tips. Pt had no questions regarding nutrition.     RECOMMEND - Referral to outpatient nutrition services for weight management after D/C.     "

## 2019-07-16 NOTE — DISCHARGE PLANNING
Care Transition Team Assessment    NIKITA Wilmer Marx spouse  [phone]    Information Source  Orientation : Oriented x 4  Who is responsible for making decisions for patient? : Patient    Readmission Evaluation  Is this a readmission?: No    Elopement Risk  Legal Hold: No  Ambulatory or Self Mobile in Wheelchair: Yes  Disoriented: No  Psychiatric Symptoms: None  History of Wandering: No  Elopement this Admit: No  Vocalizing Wanting to Leave: No  Displays Behaviors, Body Language Wanting to Leave: No-Not at Risk for Elopement  Elopement Risk: Not at Risk for Elopement         Discharge Preparedness  What is your plan after discharge?: Home with help  What are your discharge supports?: Spouse  Prior Functional Level: Independent with Activities of Daily Living    Functional Assesment  Prior Functional Level: Independent with Activities of Daily Living    Finances  Financial Barriers to Discharge: No  Prescription Coverage: Yes    Vision / Hearing Impairment  Right Eye Vision: Impaired, Wears Glasses  Left Eye Vision: Impaired, Wears Glasses              Domestic Abuse  Have you ever been the victim of abuse or violence?: No    Psychological Assessment  History of Substance Abuse: None  History of Psychiatric Problems: No  Non-compliant with Treatment: No  Newly Diagnosed Illness: No    Discharge Risks or Barriers  Discharge risks or barriers?: No    Anticipated Discharge Information  Anticipated discharge disposition: Home  Discharge Address:  [address]  Discharge Contact Phone Number: [phone]

## 2019-07-16 NOTE — THERAPY
PT eval attempted; pt declined requested PT return later as she did not feel well. Will re-attempt as able.

## 2019-07-16 NOTE — PROGRESS NOTES
[facility] Medicine Daily Progress Note    Date of Service  7/16/2019    Chief Complaint  64 y.o. female admitted 7/15/2019 with right leg pain, swelling and erythema.    Hospital Course    H/O obesity, asthma, DVT, HTN, HIRAM, DM.  Admitted with the above symptoms.  Found to have right lower extremity cellulitis.        Interval Problem Update  7/16:  Swelling and erythema of RLE improving.  Complaining of nausea.  No vomiting.  VSS.  Afebrile.    Consultants/Specialty  N/A    Code Status  FULL    Disposition  Anticipate home at d/c when medically clear.      Review of Systems  Review of Systems   Constitutional: Negative for chills and fever.   HENT: Negative for congestion.    Eyes: Negative for blurred vision and double vision.   Respiratory: Negative for cough and shortness of breath.    Cardiovascular: Negative for chest pain, palpitations and leg swelling.   Gastrointestinal: Positive for nausea. Negative for abdominal pain, constipation, diarrhea and vomiting.   Genitourinary: Negative for dysuria.   Musculoskeletal: Positive for myalgias.   Skin: Negative for itching and rash.   Neurological: Negative for dizziness, tingling, tremors, sensory change, speech change, weakness and headaches.        Physical Exam  Temp:  [36.2 °C (97.1 °F)-37.1 °C (98.8 °F)] 36.4 °C (97.5 °F)  Pulse:  [62-83] 74  Resp:  [18-20] 18  BP: ()/(34-55) 92/55  SpO2:  [91 %-98 %] 92 %    Physical Exam   Constitutional: She is oriented to person, place, and time. She appears well-developed and well-nourished. No distress.   HENT:   Head: Normocephalic and atraumatic.   Mouth/Throat: No oropharyngeal exudate.   Eyes: Conjunctivae are normal. Right eye exhibits no discharge. Left eye exhibits no discharge.   Neck: Normal range of motion. No tracheal deviation present.   Cardiovascular: Normal rate and regular rhythm.    No murmur heard.  Pulmonary/Chest: Effort normal and breath sounds normal. No stridor. No respiratory distress. She  has no wheezes.   Abdominal: Soft. Bowel sounds are normal. She exhibits no distension. There is no tenderness. There is no rebound.   Musculoskeletal: Normal range of motion. She exhibits no deformity.   Neurological: She is alert and oriented to person, place, and time. No cranial nerve deficit.   Skin: Skin is warm and dry. No rash noted. She is not diaphoretic.   RLE swelling and erythema.    Psychiatric: She has a normal mood and affect.   Nursing note and vitals reviewed.      Fluids    Intake/Output Summary (Last 24 hours) at 07/16/19 1555  Last data filed at 07/16/19 0906   Gross per 24 hour   Intake              420 ml   Output                0 ml   Net              420 ml       Laboratory  Recent Labs      07/15/19   1232  07/16/19   0419   WBC  14.3*  10.7   RBC  5.05  4.32   HEMOGLOBIN  12.6  11.3*   HEMATOCRIT  42.3  36.4*   MCV  83.8  84.3   MCH  25.0*  26.2*   MCHC  29.8*  31.0*   RDW  52.6*  53.6*   PLATELETCT  317  228   MPV  9.5  9.4     Recent Labs      07/15/19   1232  07/16/19   0419   SODIUM  138  135   POTASSIUM  4.2  4.1   CHLORIDE  104  102   CO2  25  24   GLUCOSE  112*  114*   BUN  18  26*   CREATININE  1.07  1.43*   CALCIUM  9.3  8.9     Recent Labs      07/15/19   1232  07/16/19   0419   INR  1.88*  1.93*         Recent Labs      07/16/19   0419   TRIGLYCERIDE  84   HDL  43   LDL  46       Imaging  IR-[id number]-4 VIEWS   Final Result      No evidence of bowel obstruction. Nonspecific bowel gas pattern.         DX-KNEE 2- LEFT   Final Result      Postsurgical changes status post left knee arthroplasty.      Degenerative changes.      Trace joint fluid.      Calcification of the distal quadriceps tendon can be seen with calcific tendinopathy.      Anterior loose body.            DX-CHEST-PORTABLE (1 VIEW)   Final Result      Stable cardiomegaly.      Hypoinflation.         US-EXTREMITY VENOUS LOWER UNILAT RIGHT   Final Result      DX-TIBIA AND FIBULA RIGHT   Final Result      No evidence of  acute fracture or dislocation.      Prominent calcaneal spurring.      Post surgical changes status post right knee arthroplasty.              Assessment/Plan  * Cellulitis of right lower extremity   Assessment & Plan    Redness, warmth, tenderness improving.  Continue IV Zosyn and doxycycline       Nausea   Assessment & Plan    ? Secondary to infection  KUB wnl  Treat with anti-emetics.  Diet as tolerated.      History of DVT (deep vein thrombosis)   Assessment & Plan    Continue warfarin with INR goal of 2-3  Venous Doppler ultrasound were negative     Pulmonary hypertension (HCC)   Assessment & Plan    Likely from obesity  Hold lasix secondary to UTI.  Respiratory status stable.      Chronic pain of left knee   Assessment & Plan    X-ray found evidence of loose bodies and effusion  Follow with ortho as outpatient.      Hyperglycemia   Assessment & Plan    glucose below 150 in setting of infection  Hgb A1c 5.9  No need for insulin at this time.     LALITHA (acute kidney injury) (HCC)   Assessment & Plan    Likely prerenal  IV fluid hydration with normal saline  Monitor BMP and assess response  Avoid IV contrast/nephrotoxins/NSAIDs  Dose adjust meds for decreased GFR         Hypothyroid- (present on admission)   Assessment & Plan    Continue home levothyroxine  Follow-up TSH levels     Dyslipidemia- (present on admission)   Assessment & Plan    Continue current statin dose and follow-up lipid panel     CAD (coronary artery disease)- (present on admission)   Assessment & Plan    Continue aspirin  Continue current statin of 20 mg and follow-up on lipid panel     Morbid obesity (HCC)- (present on admission)   Assessment & Plan    Patient has been counseled on diet and lifestyle modifications  Recommended outpatient weight management program and bariatric surgery evaluation  Pt educated on the increase of morbidity and mortality associated with excess weight including DM, Heart Disease, HTN, stroke, and sleep apnea.  Pt  advised weight loss of 5% through reduced calorie, low carb diet and 150 mins of exercise a week       HTN (hypertension)- (present on admission)   Assessment & Plan    Continue current medications including propanolol and metoprolol, Imdur  Patient states that she takes propranolol for tremors          VTE prophylaxis: coumadin.

## 2019-07-16 NOTE — ASSESSMENT & PLAN NOTE
Continue current medications including propanolol and metoprolol, Imdur  Patient states that she takes propranolol for tremors

## 2019-07-16 NOTE — PROGRESS NOTES
Inpatient Anticoagulation Service Note    Date: 7/15/2019    Reason for Anticoagulation: Deep Vein Thrombosis   Target INR: 2.0 to 3.0         Hemoglobin Value: 12.6  Hematocrit Value: 42.3    INR from last 7 days     Date/Time INR Value    07/15/19 1232 (!)  1.88        Dose from last 7 days     Date/Time Dose (mg)    07/15/19 1820  5        Average Dose (mg):  3-4 mg PO daily   Significant Interactions: Aspirin, Antibiotics, Thyroid Medications, Proton Pump Inhibitor  Bridge Therapy: No   Reversal Agent Administered: Not Applicable    Comments: Patient on home warfarin for history of DVT. Per patient, takes 3-4 mg PO warfarin daily. The patient's INR today is subtherapeutic but H/H are stable with no overt sxs of bleeding per chart review.    Plan:   Will give the patient warfarin 5 mg PO daily today and obtain an INR with AM labs. Pharmacy will continue to follow.     Education Material Provided?: No  Pharmacist suggested discharge dosing: TBD based on subsequent INR readings. May resume home dose of warfarin 3-4 mg PO daily with recommended INR check within a week of discharge.      Kasia Saravia, PharmD, BCPS

## 2019-07-16 NOTE — PROGRESS NOTES
Inpatient Anticoagulation Service Note    Date: 7/16/2019    Reason for Anticoagulation: Deep Vein Thrombosis   Target INR: 2.0 to 3.0      Hemoglobin Value: (!) 11.3  Hematocrit Value: (!) 36.4    INR from last 7 days     Date/Time INR Value    07/16/19 0419 (!)  1.93    07/15/19 1232 (!)  1.88        Dose from last 7 days     Date/Time Dose (mg)    07/16/19 1531  4    07/15/19 1820  5        Average Dose (mg):  Per patient: alternates warfarin 3 mg and 4 mg PO nightly  Significant Interactions: Antibiotics, Aspirin, Thyroid Medications, Statin, Proton Pump Inhibitor  Bridge Therapy: No  Reversal Agent Administered: Not Applicable    Comments: Warfarin continued from home for history of DVT. Per patient, she takes warfarin 3 to 4 mg PO nightly. Patient took 3 mg the night PTA. INR slightly sub-therapeutic. H/H stable with no s/s of overt bleeding and dose was given last night. Will schedule out warfarin 4 mg PO daily and monitor with an INR tomorrow AM. DDIs noted. Pharmacy will continue to monitor.    Plan:  Warfarin 4 mg  Education Material Provided?: No (Chronic warfarin patient)  Pharmacist suggested discharge dosing: Warfarin 3 mg PO MWF and 4 mg AOD with close follow-up post-discharge with anticoagulation clinic.    Vicky Hernandez, PharmD

## 2019-07-16 NOTE — PROGRESS NOTES
Patient to room 308 bed 2 from ER. Daughter at bedside. No c/o pain. Oxygen on at 2L. Patient ambulated from wheelchair to bed with stand by assist.

## 2019-07-17 VITALS
BODY MASS INDEX: 50.08 KG/M2 | RESPIRATION RATE: 17 BRPM | DIASTOLIC BLOOD PRESSURE: 67 MMHG | SYSTOLIC BLOOD PRESSURE: 118 MMHG | WEIGHT: 282.63 LBS | TEMPERATURE: 98.1 F | HEART RATE: 71 BPM | HEIGHT: 63 IN | OXYGEN SATURATION: 93 %

## 2019-07-17 LAB
ANION GAP SERPL CALC-SCNC: 7 MMOL/L (ref 0–11.9)
BUN SERPL-MCNC: 23 MG/DL (ref 8–22)
CALCIUM SERPL-MCNC: 8.8 MG/DL (ref 8.5–10.5)
CHLORIDE SERPL-SCNC: 109 MMOL/L (ref 96–112)
CO2 SERPL-SCNC: 24 MMOL/L (ref 20–33)
CREAT SERPL-MCNC: 1.22 MG/DL (ref 0.5–1.4)
ERYTHROCYTE [DISTWIDTH] IN BLOOD BY AUTOMATED COUNT: 54.4 FL (ref 35.9–50)
GLUCOSE SERPL-MCNC: 86 MG/DL (ref 65–99)
HCT VFR BLD AUTO: 37.3 % (ref 37–47)
HGB BLD-MCNC: 11.4 G/DL (ref 12–16)
INR PPP: 2.38 (ref 0.87–1.13)
MCH RBC QN AUTO: 26.1 PG (ref 27–33)
MCHC RBC AUTO-ENTMCNC: 30.6 G/DL (ref 33.6–35)
MCV RBC AUTO: 85.6 FL (ref 81.4–97.8)
PLATELET # BLD AUTO: 250 K/UL (ref 164–446)
PMV BLD AUTO: 9.8 FL (ref 9–12.9)
POTASSIUM SERPL-SCNC: 4.4 MMOL/L (ref 3.6–5.5)
PROTHROMBIN TIME: 26.8 SEC (ref 12–14.6)
RBC # BLD AUTO: 4.36 M/UL (ref 4.2–5.4)
SODIUM SERPL-SCNC: 140 MMOL/L (ref 135–145)
WBC # BLD AUTO: 8.8 K/UL (ref 4.8–10.8)

## 2019-07-17 PROCEDURE — A9270 NON-COVERED ITEM OR SERVICE: HCPCS | Performed by: HOSPITALIST

## 2019-07-17 PROCEDURE — 80048 BASIC METABOLIC PNL TOTAL CA: CPT

## 2019-07-17 PROCEDURE — 85027 COMPLETE CBC AUTOMATED: CPT

## 2019-07-17 PROCEDURE — 700102 HCHG RX REV CODE 250 W/ 637 OVERRIDE(OP): Performed by: HOSPITALIST

## 2019-07-17 PROCEDURE — 700105 HCHG RX REV CODE 258: Performed by: HOSPITALIST

## 2019-07-17 PROCEDURE — 36415 COLL VENOUS BLD VENIPUNCTURE: CPT

## 2019-07-17 PROCEDURE — 700111 HCHG RX REV CODE 636 W/ 250 OVERRIDE (IP): Performed by: HOSPITALIST

## 2019-07-17 PROCEDURE — 85610 PROTHROMBIN TIME: CPT

## 2019-07-17 PROCEDURE — 99239 HOSP IP/OBS DSCHRG MGMT >30: CPT | Performed by: HOSPITALIST

## 2019-07-17 RX ORDER — ONDANSETRON 4 MG/1
4 TABLET, ORALLY DISINTEGRATING ORAL EVERY 4 HOURS PRN
Qty: 10 TAB | Refills: 0 | Status: SHIPPED | OUTPATIENT
Start: 2019-07-17 | End: 2019-07-17

## 2019-07-17 RX ORDER — DOXYCYCLINE 100 MG/1
100 TABLET ORAL EVERY 12 HOURS
Qty: 10 TAB | Refills: 0 | Status: SHIPPED | OUTPATIENT
Start: 2019-07-17 | End: 2019-07-22

## 2019-07-17 RX ORDER — IBUPROFEN 400 MG/1
400 TABLET ORAL EVERY 6 HOURS PRN
Qty: 30 TAB | Refills: 0 | Status: SHIPPED | OUTPATIENT
Start: 2019-07-17 | End: 2019-07-17

## 2019-07-17 RX ORDER — ONDANSETRON 4 MG/1
4 TABLET, ORALLY DISINTEGRATING ORAL EVERY 4 HOURS PRN
Qty: 10 TAB | Refills: 0 | Status: ON HOLD | OUTPATIENT
Start: 2019-07-17 | End: 2022-06-06

## 2019-07-17 RX ORDER — AMOXICILLIN AND CLAVULANATE POTASSIUM 875; 125 MG/1; MG/1
1 TABLET, FILM COATED ORAL 2 TIMES DAILY
Qty: 10 TAB | Refills: 0 | Status: SHIPPED | OUTPATIENT
Start: 2019-07-17 | End: 2019-07-17

## 2019-07-17 RX ORDER — DOXYCYCLINE 100 MG/1
100 TABLET ORAL EVERY 12 HOURS
Qty: 10 TAB | Refills: 0 | Status: SHIPPED | OUTPATIENT
Start: 2019-07-17 | End: 2019-07-17

## 2019-07-17 RX ORDER — FLUCONAZOLE 150 MG/1
150 TABLET ORAL
Qty: 2 TAB | Refills: 0 | Status: ON HOLD | OUTPATIENT
Start: 2019-07-17 | End: 2022-06-06

## 2019-07-17 RX ORDER — AMOXICILLIN AND CLAVULANATE POTASSIUM 875; 125 MG/1; MG/1
1 TABLET, FILM COATED ORAL 2 TIMES DAILY
Qty: 10 TAB | Refills: 0 | Status: SHIPPED | OUTPATIENT
Start: 2019-07-17 | End: 2019-07-22

## 2019-07-17 RX ORDER — IBUPROFEN 400 MG/1
400 TABLET ORAL EVERY 6 HOURS PRN
Qty: 30 TAB | Refills: 0 | Status: ON HOLD | OUTPATIENT
Start: 2019-07-17 | End: 2022-06-06

## 2019-07-17 RX ADMIN — OXYCODONE HYDROCHLORIDE AND ACETAMINOPHEN 1 TABLET: 10; 325 TABLET ORAL at 08:26

## 2019-07-17 RX ADMIN — ARIPIPRAZOLE 10 MG: 10 TABLET ORAL at 06:03

## 2019-07-17 RX ADMIN — OMEPRAZOLE 20 MG: 20 CAPSULE, DELAYED RELEASE ORAL at 06:03

## 2019-07-17 RX ADMIN — DOXYCYCLINE 100 MG: 100 TABLET, FILM COATED ORAL at 06:02

## 2019-07-17 RX ADMIN — BUDESONIDE AND FORMOTEROL FUMARATE DIHYDRATE 2 PUFF: 160; 4.5 AEROSOL RESPIRATORY (INHALATION) at 06:02

## 2019-07-17 RX ADMIN — METOPROLOL TARTRATE 25 MG: 25 TABLET, FILM COATED ORAL at 06:03

## 2019-07-17 RX ADMIN — SERTRALINE HYDROCHLORIDE 100 MG: 100 TABLET ORAL at 06:03

## 2019-07-17 RX ADMIN — FLUTICASONE PROPIONATE 50 MCG: 50 SPRAY, METERED NASAL at 06:02

## 2019-07-17 RX ADMIN — ASPIRIN 81 MG: 81 TABLET, COATED ORAL at 06:03

## 2019-07-17 RX ADMIN — PROPRANOLOL HYDROCHLORIDE 60 MG: 60 CAPSULE, EXTENDED RELEASE ORAL at 06:03

## 2019-07-17 RX ADMIN — ONDANSETRON 4 MG: 4 TABLET, ORALLY DISINTEGRATING ORAL at 08:26

## 2019-07-17 RX ADMIN — PIPERACILLIN AND TAZOBACTAM 3.38 G: 3; .375 INJECTION, POWDER, LYOPHILIZED, FOR SOLUTION INTRAVENOUS; PARENTERAL at 06:02

## 2019-07-17 RX ADMIN — LEVOTHYROXINE SODIUM 100 MCG: 100 TABLET ORAL at 06:02

## 2019-07-17 RX ADMIN — SENNOSIDES, DOCUSATE SODIUM 2 TABLET: 50; 8.6 TABLET, FILM COATED ORAL at 06:02

## 2019-07-17 RX ADMIN — ISOSORBIDE MONONITRATE 30 MG: 30 TABLET, EXTENDED RELEASE ORAL at 06:02

## 2019-07-17 RX ADMIN — BENAZEPRIL HYDROCHLORIDE 40 MG: 10 TABLET ORAL at 06:03

## 2019-07-17 NOTE — PROGRESS NOTES
Patient discharged to home with relative. PIV removed. Discharge instructions given and all questions answered. All belongings with patient.

## 2019-07-17 NOTE — DISCHARGE SUMMARY
"Discharge Summary    CHIEF COMPLAINT ON ADMISSION  Chief Complaint   Patient presents with   • Leg Pain   • Leg Swelling     redness and \"lump\" to lower lateral leg       Reason for Admission  right leg pain and swelling     Admission Date  7/15/2019    CODE STATUS  Full Code    HPI & HOSPITAL COURSE  This is a 64 y.o. Female with a past medical history of obesity, asthma, DVT, hypertension, sleep apnea, diabetes here with complaints of right lower extremity pain, swelling, and erythema.  On admission she was found to have right lower extremity cellulitis.  She was initiated on antibiotic therapy and was monitored in the hospital.  She was also noted to have an acute kidney injury with a mildly elevated creatinine suspect secondary to dehydration.  She was initiated on IV hydration.     She had progressive improvement in her symptoms over her hospital stay.  She as remained afebrile with stable vital signs.  At this time she will be transitioned to oral Augmentin and Doxycyline to complete a 7 day regimen.      Her kidney function is much improved.  She is recommended to maintain daily oral fluid intake of 2 L/day.  She can follow with her PCP for monitoring of her BMP.     The patient also complained of left knee pain, which she reports is chronic.  Further evaluation with x-ray revealed trace joint fluid and possible tendinopathy.  There was no evidence of acute fracture.  She can follow with orthopedics as an outpatient for further evaluation and treatment options.    At this time, the patient's symptoms are much improved and she is medically clear.  She will complete an oral antibiotic regimen and will follow closely with her outpatient physicians.    Therefore, she is discharged in good and stable condition to home with close outpatient follow-up.    The patient met 2-midnight criteria for an inpatient stay at the time of discharge.    Discharge Date  7/17/2019    FOLLOW UP ITEMS POST DISCHARGE  She can follow " with her PCP for repeat BMP in 1 week to follow her creatinine  She is also recommended to follow with orthopedics for further evaluation of her left knee pain.    DISCHARGE DIAGNOSES  Principal Problem:    Cellulitis of right lower extremity POA: Unknown  Active Problems:    HTN (hypertension) POA: Yes    Morbid obesity (HCC) POA: Yes    CAD (coronary artery disease) POA: Yes    Dyslipidemia POA: Yes    Hypothyroid POA: Yes    LALITHA (acute kidney injury) (HCC) POA: Unknown    Hyperglycemia POA: Unknown    Chronic pain of left knee POA: Unknown    Pulmonary hypertension (HCC) POA: Unknown    History of DVT (deep vein thrombosis) POA: Unknown    Nausea POA: Unknown  Resolved Problems:    * No resolved hospital problems. *      FOLLOW UP  She will follow with her PCP in 1-2 weeks.     MEDICATIONS ON DISCHARGE     Medication List      START taking these medications      Instructions   amoxicillin-clavulanate 875-125 MG Tabs  Commonly known as:  AUGMENTIN   Take 1 Tab by mouth 2 times a day for 5 days.  Dose:  1 Tab     doxycycline monohydrate 100 MG tablet  Commonly known as:  ADOXA   Take 1 Tab by mouth every 12 hours for 5 days.  Dose:  100 mg     ibuprofen 400 MG Tabs  Commonly known as:  MOTRIN   Take 1 Tab by mouth every 6 hours as needed for Moderate Pain.  Dose:  400 mg        CHANGE how you take these medications      Instructions   ondansetron 4 MG Tbdp  What changed:  · when to take this  · reasons to take this  Commonly known as:  ZOFRAN ODT   Take 1 Tab by mouth every four hours as needed for Nausea (give PO if no IV route available).  Dose:  4 mg        CONTINUE taking these medications      Instructions   ARIPiprazole 10 MG Tabs  Commonly known as:  ABILIFY   Take 10 mg by mouth every day.  Dose:  10 mg     aspirin 81 MG tablet   Take 81 mg by mouth every day.  Dose:  81 mg     B COMPLEX PO   Take 1 Tab by mouth every evening.  Dose:  1 Tab     benazepril 40 MG tablet  Commonly known as:  LOTENSIN   Take 1  Tab by mouth every day. Needs follow up for future refills  Dose:  40 mg     BIOTIN PO   Take 1 Tab by mouth every evening.  Dose:  1 Tab     BLACK COHOSH PO   Take 1 Tab by mouth every evening.  Dose:  1 Tab     budesonide-formoterol 160-4.5 MCG/ACT Aero  Commonly known as:  SYMBICORT   Inhale 2 Puffs by mouth 2 Times a Day. Sample from doctor  Dose:  2 Puff     CALCIUM + D PO   Take 1 Tab by mouth every evening.  Dose:  1 Tab     fluticasone 50 MCG/ACT nasal spray  Commonly known as:  FLONASE   Spray 1 Spray in nose every day.  Dose:  1 Spray     furosemide 20 MG Tabs  Commonly known as:  LASIX   Take 20 mg by mouth every day.  Dose:  20 mg     isosorbide mononitrate SR 30 MG Tb24  Commonly known as:  IMDUR   Take 1 Tab by mouth every day.  Dose:  30 mg     levothyroxine 100 MCG Tabs  Commonly known as:  SYNTHROID   Take 100 mcg by mouth every day.  Dose:  100 mcg     metoprolol 25 MG Tabs  Commonly known as:  LOPRESSOR   Take 25 mg by mouth 2 times a day.  Dose:  25 mg     mirtazapine 15 MG Tabs  Commonly known as:  REMERON   Take 15 mg by mouth every bedtime.  Dose:  15 mg     ONE-A-DAY WOMENS 50 PLUS PO   Take 1 Tab by mouth every day.  Dose:  1 Tab     OSTEO BI-FLEX ADV DOUBLE ST PO   Take 1 Tab by mouth 2 Times a Day.  Dose:  1 Tab     oxyCODONE-acetaminophen  MG Tabs  Commonly known as:  PERCOCET-10   Take 1 Tab by mouth every 6 hours as needed for Severe Pain.  Dose:  1 Tab     pantoprazole 40 MG Tbec  Commonly known as:  PROTONIX   Take 40 mg by mouth every day.  Dose:  40 mg     potassium chloride 20 MEQ Pack  Commonly known as:  KLOR-CON   Take 20 mEq by mouth every day.  Dose:  20 mEq     PROBIOTIC PO   Take 1 Cap by mouth every evening.  Dose:  1 Cap     propranolol LA 60 MG Cp24  Commonly known as:  INDERAL LA   Take 60 mg by mouth every day.  Dose:  60 mg     sertraline 100 MG Tabs  Commonly known as:  ZOLOFT   Take 100 mg by mouth every day.  Dose:  100 mg     warfarin 3 MG Tabs  Commonly  known as:  COUMADIN   Take 3-4 mg by mouth every evening.  Dose:  3-4 mg     ZOCOR 20 MG Tabs  Generic drug:  simvastatin   Take 20 mg by mouth every evening.  Dose:  20 mg            Allergies  Allergies   Allergen Reactions   • Strawberry Unspecified     Bumps on tongue and throat swelling   • Codeine Vomiting and Nausea       DIET  Orders Placed This Encounter   Procedures   • Diet Order 2 Gram Sodium     Standing Status:   Standing     Number of Occurrences:   1     Order Specific Question:   Diet:     Answer:   2 Gram Sodium [7]       ACTIVITY  As tolerated.  Weight bearing as tolerated    LABORATORY  Lab Results   Component Value Date    SODIUM 140 07/17/2019    POTASSIUM 4.4 07/17/2019    CHLORIDE 109 07/17/2019    CO2 24 07/17/2019    GLUCOSE 86 07/17/2019    BUN 23 (H) 07/17/2019    CREATININE 1.22 07/17/2019    CREATININE 1.0 04/14/2006        Lab Results   Component Value Date    WBC 8.8 07/17/2019    HEMOGLOBIN 11.4 (L) 07/17/2019    HEMATOCRIT 37.3 07/17/2019    PLATELETCT 250 07/17/2019        Total time of the discharge process was 35 minutes.

## 2019-07-17 NOTE — DISCHARGE INSTRUCTIONS
Discharge Instructions    Discharged to home by car with friend. Discharged via wheelchair, hospital escort: Yes.  Special equipment needed: Not Applicable    Be sure to schedule a follow-up appointment with your primary care doctor or any specialists as instructed.     Discharge Plan:   Diet Plan: Discussed  Activity Level: Discussed  Confirmed Follow up Appointment: Patient to Call and Schedule Appointment  Confirmed Symptoms Management: Discussed  Medication Reconciliation Updated: Yes  Influenza Vaccine Indication: Indicated: Not available from distributor/    I understand that a diet low in cholesterol, fat, and sodium is recommended for good health. Unless I have been given specific instructions below for another diet, I accept this instruction as my diet prescription.   Other diet: 2g Sodium        Cellulitis, Adult  Introduction  Cellulitis is a skin infection. The infected area is usually red and sore. This condition occurs most often in the arms and lower legs. It is very important to get treated for this condition.  Follow these instructions at home:  · Take over-the-counter and prescription medicines only as told by your doctor.  · If you were prescribed an antibiotic medicine, take it as told by your doctor. Do not stop taking the antibiotic even if you start to feel better.  · Drink enough fluid to keep your pee (urine) clear or pale yellow.  · Do not touch or rub the infected area.  · Raise (elevate) the infected area above the level of your heart while you are sitting or lying down.  · Place warm or cold wet cloths (warm or cold compresses) on the infected area. Do this as told by your doctor.  · Keep all follow-up visits as told by your doctor. This is important. These visits let your doctor make sure your infection is not getting worse.  Contact a doctor if:  · You have a fever.  · Your symptoms do not get better after 1-2 days of treatment.  · Your bone or joint under the infected area  starts to hurt after the skin has healed.  · Your infection comes back. This can happen in the same area or another area.  · You have a swollen bump in the infected area.  · You have new symptoms.  · You feel ill and also have muscle aches and pains.  Get help right away if:  · Your symptoms get worse.  · You feel very sleepy.  · You throw up (vomit) or have watery poop (diarrhea) for a long time.  · There are red streaks coming from the infected area.  · Your red area gets larger.  · Your red area turns darker.  This information is not intended to replace advice given to you by your health care provider. Make sure you discuss any questions you have with your health care provider.  Document Released: 06/05/2009 Document Revised: 05/25/2017 Document Reviewed: 10/26/2016  © 2017 Elsevier      Antibiotic Medicine  Antibiotic medicines are used to treat infections caused by bacteria. They work by injuring or killing the bacteria that is making you sick.  HOW IS AN ANTIBIOTIC CHOSEN?  An antibiotic is chosen based on many factors. To help your health care provider choose one for you, tell your health care provider if:  · You have any allergies.  · You are pregnant or plan to get pregnant.  · You are breastfeeding.  · You are taking any medicines. These include over-the-counter medicines, prescription medicines, and herbal remedies.  · You have a medical condition or problem you have not already discussed.  Your health care provider will also consider:  · How often the medicine has to be taken.  · Common side effects of the medicine.  · The cost of the medicine.  · The taste of the medicine.  If you have questions about why an antibiotic was chosen, make sure to ask.  FOR HOW LONG SHOULD I TAKE MY ANTIBIOTIC?  Continue to take your antibiotic for as long as told by your health care provider. Do not stop taking it when you feel better. If you stop taking it too soon:  · You may start to feel sick again.  · Your infection  may become harder to treat.  · Complications may develop.  WHAT IF I MISS A DOSE?  Try not to miss any doses of medicine. If you miss a dose, take it as soon as possible. However, if it is almost time for the next dose:  · If you are taking 2 doses per day, take the missed dose and the next dose 5 to 6 hours apart.  · If you are taking 3 or more doses per day, take the missed dose and the next dose 2 to 4 hours apart, then go back to the normal schedule.  If you cannot make up a missed dose, take the next scheduled dose on time. Then take the missed dose after you have taken all the doses as recommended by your health care provider, as if you had one more dose left.  DO ANTIBIOTICS AFFECT BIRTH CONTROL?  Birth control pills may not work while you are on antibiotics. If you are taking birth control pills, continue taking them as usual and use a second form of birth control, such as a condom, to avoid unwanted pregnancy. Continue using the second form of birth control until you are finished with your current 1 month cycle of birth control pills.  OTHER INFORMATION  · If there is any medicine left over, throw it away.  · Never take someone else's antibiotics.  · Never take leftover antibiotics.  SEEK MEDICAL CARE IF:  · You get worse.  · You do not feel better within a few days of starting the antibiotic medicine.  · You vomit.  · White patches appear in your mouth.  · You have new joint pain that begins after starting the antibiotic.  · You have new muscle aches that begin after starting the antibiotic.  · You had a fever before starting the antibiotic and it returns.  · You have any symptoms of an allergic reaction, such as an itchy rash. If this happens, stop taking the antibiotic.  SEEK IMMEDIATE MEDICAL CARE IF:  · Your urine turns dark or becomes blood-colored.  · Your skin turns yellow.  · You bruise or bleed easily.  · You have severe diarrhea and abdominal cramps.  · You have a severe headache.  · You have  signs of a severe allergic reaction, such as:  ¨ Trouble breathing.  ¨ Wheezing.  ¨ Swelling of the lips, tongue, or face.  ¨ Fainting.  ¨ Blisters on the skin or in the mouth.  If you have signs of a severe allergic reaction, stop taking the antibiotic right away.  This information is not intended to replace advice given to you by your health care provider. Make sure you discuss any questions you have with your health care provider.  Document Released: 08/30/2005 Document Revised: 09/07/2016 Document Reviewed: 05/04/2016  [employer] Interactive Patient Education © 2017 [employer]        Depression / Suicide Risk    As you are discharged from this [facility] facility, it is important to learn how to keep safe from harming yourself.    Recognize the warning signs:  · Abrupt changes in personality, positive or negative- including increase in energy   · Giving away possessions  · Change in eating patterns- significant weight changes-  positive or negative  · Change in sleeping patterns- unable to sleep or sleeping all the time   · Unwillingness or inability to communicate  · Depression  · Unusual sadness, discouragement and loneliness  · Talk of wanting to die  · Neglect of personal appearance   · Rebelliousness- reckless behavior  · Withdrawal from people/activities they love  · Confusion- inability to concentrate     If you or a loved one observes any of these behaviors or has concerns about self-harm, here's what you can do:  · Talk about it- your feelings and reasons for harming yourself  · Remove any means that you might use to hurt yourself (examples: pills, rope, extension cords, firearm)  · Get professional help from the community (Mental Health, Substance Abuse, psychological counseling)  · Do not be alone:Call your Safe Contact- someone whom you trust who will be there for you.  · Call your local CRISIS HOTLINE 480-7235 or 360-156-1757  · Call your local Children's Mobile Crisis Response Team [place] (838) 475-3308 or www.[employer]  · Call the toll free National Suicide Prevention Hotlines   · National Suicide Prevention Lifeline 729-693-KGNE (0823)  · National SNS[employer] Line Network 800-SUICIDE (083-7690)

## 2019-07-20 LAB
BACTERIA BLD CULT: NORMAL
BACTERIA BLD CULT: NORMAL
SIGNIFICANT IND 70042: NORMAL
SIGNIFICANT IND 70042: NORMAL
SITE SITE: NORMAL
SITE SITE: NORMAL
SOURCE SOURCE: NORMAL
SOURCE SOURCE: NORMAL

## 2019-07-31 NOTE — DOCUMENTATION QUERY
[facility]                                                                       Query Response Note      PATIENT:               HARESH KAUR  ACCT #:                  [id number]  MRN:                     [id number]  :                      1955  ADMIT DATE:       7/15/2019 12:12 PM  DISCH DATE:        2019 11:10 AM  RESPONDING  PROVIDER #:        [id number]           QUERY TEXT:    Depression is documented in the Medical Record.  Please specify the type.    NOTE:  If an appropriate response is not listed below, please respond with a new note.              The patient's Clinical Indicators include:  HP-anxiety/depression    Treatment:  abilify, zoloft  Options provided:   -- MDD, recurrent, in full remission   -- MDD, single episode, in full remission   -- MDD, recurrent, in partial remission   -- MDD, single episode, in partial remission   -- MDD, mild, single episode   -- MDD, mild, recurrent, current episode   -- MDD, moderate, single episode   -- MDD, moderate, recurrent, current episode   -- MDD, severe, single episode, without psychotic features   -- MDD, severe, single episode, with psychotic features   -- MDD, severe, recurrent, current episode, without psychotic features   -- MDD, severe, recurrent, current episode, with psychotic features   -- Situational/Grief Reaction depression   -- Unable to determine      Query created by: Shanice Alberto on 2019 4:34 AM    RESPONSE TEXT:    Unable to determine          Electronically signed by:  FLORA MESA 2019 7:41 PM

## 2021-03-10 ENCOUNTER — HOSPITAL ENCOUNTER (OUTPATIENT)
Facility: MEDICAL CENTER | Age: 66
End: 2021-03-10
Attending: SPECIALIST
Payer: MEDICARE

## 2021-03-10 LAB
FORWARD REASON: SPWHY: NORMAL
FORWARD REASON: SPWHY: NORMAL
FORWARDED TO LAB: SPWHR: NORMAL
FORWARDED TO LAB: SPWHR: NORMAL
SPECIMEN SENT: SPWT1: NORMAL
SPECIMEN SENT: SPWT1: NORMAL

## 2021-03-10 PROCEDURE — 87624 HPV HI-RISK TYP POOLED RSLT: CPT

## 2021-03-10 PROCEDURE — 87591 N.GONORRHOEAE DNA AMP PROB: CPT

## 2021-03-10 PROCEDURE — 87491 CHLMYD TRACH DNA AMP PROBE: CPT

## 2021-03-10 PROCEDURE — 88175 CYTOPATH C/V AUTO FLUID REDO: CPT

## 2021-03-13 LAB
C TRACH DNA GENITAL QL NAA+PROBE: NEGATIVE
CYTOLOGY REG CYTOL: NORMAL
HPV HR 12 DNA CVX QL NAA+PROBE: NEGATIVE
HPV16 DNA SPEC QL NAA+PROBE: NEGATIVE
HPV18 DNA SPEC QL NAA+PROBE: NEGATIVE
N GONORRHOEA DNA GENITAL QL NAA+PROBE: NEGATIVE
SPECIMEN SOURCE: NORMAL
SPECIMEN SOURCE: NORMAL

## 2022-06-02 ENCOUNTER — APPOINTMENT (OUTPATIENT)
Dept: RADIOLOGY | Facility: MEDICAL CENTER | Age: 67
End: 2022-06-02
Payer: COMMERCIAL

## 2022-06-03 ENCOUNTER — HOSPITAL ENCOUNTER (INPATIENT)
Facility: MEDICAL CENTER | Age: 67
LOS: 5 days | DRG: 871 | End: 2022-06-08
Attending: INTERNAL MEDICINE | Admitting: INTERNAL MEDICINE
Payer: COMMERCIAL

## 2022-06-03 ENCOUNTER — APPOINTMENT (OUTPATIENT)
Dept: RADIOLOGY | Facility: MEDICAL CENTER | Age: 67
DRG: 871 | End: 2022-06-03
Attending: STUDENT IN AN ORGANIZED HEALTH CARE EDUCATION/TRAINING PROGRAM
Payer: COMMERCIAL

## 2022-06-03 ENCOUNTER — APPOINTMENT (OUTPATIENT)
Dept: RADIOLOGY | Facility: MEDICAL CENTER | Age: 67
End: 2022-06-03
Payer: COMMERCIAL

## 2022-06-03 DIAGNOSIS — I27.20 PULMONARY HYPERTENSION (HCC): ICD-10-CM

## 2022-06-03 DIAGNOSIS — I10 PRIMARY HYPERTENSION: ICD-10-CM

## 2022-06-03 DIAGNOSIS — I20.9 OTHER AND UNSPECIFIED ANGINA PECTORIS: ICD-10-CM

## 2022-06-03 DIAGNOSIS — R11.0 NAUSEA: ICD-10-CM

## 2022-06-03 DIAGNOSIS — I25.10 CORONARY ARTERY DISEASE INVOLVING NATIVE CORONARY ARTERY OF NATIVE HEART WITHOUT ANGINA PECTORIS: ICD-10-CM

## 2022-06-03 DIAGNOSIS — E66.01 MORBID OBESITY (HCC): ICD-10-CM

## 2022-06-03 DIAGNOSIS — R57.9 SHOCK (HCC): ICD-10-CM

## 2022-06-03 DIAGNOSIS — J96.21 ACUTE ON CHRONIC RESPIRATORY FAILURE WITH HYPOXIA (HCC): ICD-10-CM

## 2022-06-03 DIAGNOSIS — Z95.1 HX OF CABG: ICD-10-CM

## 2022-06-03 DIAGNOSIS — J44.9 CHRONIC OBSTRUCTIVE PULMONARY DISEASE, UNSPECIFIED COPD TYPE (HCC): ICD-10-CM

## 2022-06-03 DIAGNOSIS — Z86.718 HISTORY OF DVT (DEEP VEIN THROMBOSIS): ICD-10-CM

## 2022-06-03 PROBLEM — Z71.89 ACP (ADVANCE CARE PLANNING): Status: ACTIVE | Noted: 2022-06-03

## 2022-06-03 PROBLEM — I82.90 VTE (VENOUS THROMBOEMBOLISM): Status: ACTIVE | Noted: 2022-06-03

## 2022-06-03 PROBLEM — A41.9 SEPSIS (HCC): Status: ACTIVE | Noted: 2022-06-03

## 2022-06-03 PROBLEM — N30.01 ACUTE CYSTITIS WITH HEMATURIA: Status: ACTIVE | Noted: 2022-06-03

## 2022-06-03 PROBLEM — R65.21 SEPTIC SHOCK (HCC): Status: ACTIVE | Noted: 2022-06-03

## 2022-06-03 PROBLEM — N18.9 ACUTE KIDNEY INJURY SUPERIMPOSED ON CHRONIC KIDNEY DISEASE (HCC): Status: ACTIVE | Noted: 2019-07-16

## 2022-06-03 PROBLEM — R31.9 HEMATURIA: Status: ACTIVE | Noted: 2022-06-03

## 2022-06-03 LAB
APPEARANCE UR: CLEAR
APTT PPP: 29.7 SEC (ref 24.7–36)
BACTERIA #/AREA URNS HPF: NEGATIVE /HPF
BILIRUB UR QL STRIP.AUTO: NEGATIVE
COLOR UR: YELLOW
EKG IMPRESSION: NORMAL
EPI CELLS #/AREA URNS HPF: NEGATIVE /HPF
GLUCOSE UR STRIP.AUTO-MCNC: NEGATIVE MG/DL
HYALINE CASTS #/AREA URNS LPF: ABNORMAL /LPF
INR PPP: 1.45 (ref 0.87–1.13)
KETONES UR STRIP.AUTO-MCNC: NEGATIVE MG/DL
LEUKOCYTE ESTERASE UR QL STRIP.AUTO: ABNORMAL
MICRO URNS: ABNORMAL
NITRITE UR QL STRIP.AUTO: NEGATIVE
PH UR STRIP.AUTO: 5 [PH] (ref 5–8)
PROT UR QL STRIP: NEGATIVE MG/DL
PROTHROMBIN TIME: 17.2 SEC (ref 12–14.6)
RBC # URNS HPF: ABNORMAL /HPF
RBC UR QL AUTO: NEGATIVE
SP GR UR STRIP.AUTO: 1.01
UFH PPP CHRO-ACNC: <0.1 IU/ML
UROBILINOGEN UR STRIP.AUTO-MCNC: 0.2 MG/DL
WBC #/AREA URNS HPF: ABNORMAL /HPF

## 2022-06-03 PROCEDURE — 99223 1ST HOSP IP/OBS HIGH 75: CPT | Mod: 25 | Performed by: STUDENT IN AN ORGANIZED HEALTH CARE EDUCATION/TRAINING PROGRAM

## 2022-06-03 PROCEDURE — 700102 HCHG RX REV CODE 250 W/ 637 OVERRIDE(OP): Performed by: STUDENT IN AN ORGANIZED HEALTH CARE EDUCATION/TRAINING PROGRAM

## 2022-06-03 PROCEDURE — 700105 HCHG RX REV CODE 258: Performed by: STUDENT IN AN ORGANIZED HEALTH CARE EDUCATION/TRAINING PROGRAM

## 2022-06-03 PROCEDURE — 80061 LIPID PANEL: CPT

## 2022-06-03 PROCEDURE — 71045 X-RAY EXAM CHEST 1 VIEW: CPT

## 2022-06-03 PROCEDURE — 87040 BLOOD CULTURE FOR BACTERIA: CPT

## 2022-06-03 PROCEDURE — 93005 ELECTROCARDIOGRAM TRACING: CPT | Performed by: STUDENT IN AN ORGANIZED HEALTH CARE EDUCATION/TRAINING PROGRAM

## 2022-06-03 PROCEDURE — A9270 NON-COVERED ITEM OR SERVICE: HCPCS | Performed by: STUDENT IN AN ORGANIZED HEALTH CARE EDUCATION/TRAINING PROGRAM

## 2022-06-03 PROCEDURE — 85025 COMPLETE CBC W/AUTO DIFF WBC: CPT

## 2022-06-03 PROCEDURE — 85610 PROTHROMBIN TIME: CPT

## 2022-06-03 PROCEDURE — 84100 ASSAY OF PHOSPHORUS: CPT

## 2022-06-03 PROCEDURE — 93010 ELECTROCARDIOGRAM REPORT: CPT | Performed by: INTERNAL MEDICINE

## 2022-06-03 PROCEDURE — 85730 THROMBOPLASTIN TIME PARTIAL: CPT

## 2022-06-03 PROCEDURE — 81001 URINALYSIS AUTO W/SCOPE: CPT

## 2022-06-03 PROCEDURE — 99497 ADVNCD CARE PLAN 30 MIN: CPT | Mod: 25 | Performed by: STUDENT IN AN ORGANIZED HEALTH CARE EDUCATION/TRAINING PROGRAM

## 2022-06-03 PROCEDURE — 85520 HEPARIN ASSAY: CPT

## 2022-06-03 PROCEDURE — 84145 PROCALCITONIN (PCT): CPT

## 2022-06-03 PROCEDURE — 80053 COMPREHEN METABOLIC PANEL: CPT

## 2022-06-03 PROCEDURE — 83735 ASSAY OF MAGNESIUM: CPT

## 2022-06-03 PROCEDURE — 83036 HEMOGLOBIN GLYCOSYLATED A1C: CPT

## 2022-06-03 PROCEDURE — 84550 ASSAY OF BLOOD/URIC ACID: CPT

## 2022-06-03 PROCEDURE — 700111 HCHG RX REV CODE 636 W/ 250 OVERRIDE (IP): Performed by: STUDENT IN AN ORGANIZED HEALTH CARE EDUCATION/TRAINING PROGRAM

## 2022-06-03 PROCEDURE — 770000 HCHG ROOM/CARE - INTERMEDIATE ICU *

## 2022-06-03 PROCEDURE — 82533 TOTAL CORTISOL: CPT

## 2022-06-03 RX ORDER — HEPARIN SODIUM 5000 [USP'U]/ML
5000 INJECTION, SOLUTION INTRAVENOUS; SUBCUTANEOUS EVERY 8 HOURS
Status: DISCONTINUED | OUTPATIENT
Start: 2022-06-03 | End: 2022-06-03

## 2022-06-03 RX ORDER — SODIUM BICARBONATE IN D5W 150/1000ML
PLASTIC BAG, INJECTION (ML) INTRAVENOUS CONTINUOUS
Status: DISCONTINUED | OUTPATIENT
Start: 2022-06-03 | End: 2022-06-05

## 2022-06-03 RX ORDER — SODIUM BICARBONATE 650 MG/1
1300 TABLET ORAL
Status: DISCONTINUED | OUTPATIENT
Start: 2022-06-03 | End: 2022-06-03

## 2022-06-03 RX ORDER — SERTRALINE HYDROCHLORIDE 100 MG/1
100 TABLET, FILM COATED ORAL DAILY
Status: DISCONTINUED | OUTPATIENT
Start: 2022-06-04 | End: 2022-06-08 | Stop reason: HOSPADM

## 2022-06-03 RX ORDER — SEVELAMER CARBONATE 800 MG/1
800 TABLET, FILM COATED ORAL
Status: DISCONTINUED | OUTPATIENT
Start: 2022-06-03 | End: 2022-06-05

## 2022-06-03 RX ORDER — LEVOTHYROXINE SODIUM 0.1 MG/1
100 TABLET ORAL DAILY
Status: DISCONTINUED | OUTPATIENT
Start: 2022-06-04 | End: 2022-06-08 | Stop reason: HOSPADM

## 2022-06-03 RX ORDER — OMEPRAZOLE 20 MG/1
20 CAPSULE, DELAYED RELEASE ORAL 2 TIMES DAILY
Status: DISCONTINUED | OUTPATIENT
Start: 2022-06-03 | End: 2022-06-08 | Stop reason: HOSPADM

## 2022-06-03 RX ORDER — SODIUM CHLORIDE 9 MG/ML
INJECTION, SOLUTION INTRAVENOUS CONTINUOUS
Status: DISCONTINUED | OUTPATIENT
Start: 2022-06-03 | End: 2022-06-03

## 2022-06-03 RX ORDER — ATORVASTATIN CALCIUM 40 MG/1
40 TABLET, FILM COATED ORAL EVERY EVENING
Status: DISCONTINUED | OUTPATIENT
Start: 2022-06-03 | End: 2022-06-08 | Stop reason: HOSPADM

## 2022-06-03 RX ORDER — PANTOPRAZOLE SODIUM 40 MG/1
40 TABLET, DELAYED RELEASE ORAL DAILY
Status: DISCONTINUED | OUTPATIENT
Start: 2022-06-04 | End: 2022-06-03

## 2022-06-03 RX ORDER — SODIUM CHLORIDE, SODIUM LACTATE, POTASSIUM CHLORIDE, AND CALCIUM CHLORIDE .6; .31; .03; .02 G/100ML; G/100ML; G/100ML; G/100ML
500 INJECTION, SOLUTION INTRAVENOUS
Status: DISCONTINUED | OUTPATIENT
Start: 2022-06-03 | End: 2022-06-05

## 2022-06-03 RX ORDER — BISACODYL 10 MG
10 SUPPOSITORY, RECTAL RECTAL
Status: DISCONTINUED | OUTPATIENT
Start: 2022-06-03 | End: 2022-06-08 | Stop reason: HOSPADM

## 2022-06-03 RX ORDER — HEPARIN SODIUM 5000 [USP'U]/100ML
0-30 INJECTION, SOLUTION INTRAVENOUS CONTINUOUS
Status: DISCONTINUED | OUTPATIENT
Start: 2022-06-03 | End: 2022-06-05

## 2022-06-03 RX ORDER — OXYCODONE HYDROCHLORIDE 10 MG/1
10 TABLET ORAL EVERY 6 HOURS PRN
Status: DISCONTINUED | OUTPATIENT
Start: 2022-06-03 | End: 2022-06-08 | Stop reason: HOSPADM

## 2022-06-03 RX ORDER — ONDANSETRON 2 MG/ML
4 INJECTION INTRAMUSCULAR; INTRAVENOUS EVERY 4 HOURS PRN
Status: DISCONTINUED | OUTPATIENT
Start: 2022-06-03 | End: 2022-06-08 | Stop reason: HOSPADM

## 2022-06-03 RX ORDER — HEPARIN SODIUM 1000 [USP'U]/ML
5000 INJECTION, SOLUTION INTRAVENOUS; SUBCUTANEOUS PRN
Status: DISCONTINUED | OUTPATIENT
Start: 2022-06-03 | End: 2022-06-05

## 2022-06-03 RX ORDER — ACETAMINOPHEN 325 MG/1
650 TABLET ORAL EVERY 6 HOURS PRN
Status: DISCONTINUED | OUTPATIENT
Start: 2022-06-03 | End: 2022-06-08 | Stop reason: HOSPADM

## 2022-06-03 RX ORDER — FLUTICASONE PROPIONATE 50 MCG
1 SPRAY, SUSPENSION (ML) NASAL DAILY
Status: DISCONTINUED | OUTPATIENT
Start: 2022-06-04 | End: 2022-06-03

## 2022-06-03 RX ORDER — ONDANSETRON 4 MG/1
4 TABLET, ORALLY DISINTEGRATING ORAL EVERY 4 HOURS PRN
Status: DISCONTINUED | OUTPATIENT
Start: 2022-06-03 | End: 2022-06-08 | Stop reason: HOSPADM

## 2022-06-03 RX ORDER — HYDRALAZINE HYDROCHLORIDE 20 MG/ML
10 INJECTION INTRAMUSCULAR; INTRAVENOUS EVERY 4 HOURS PRN
Status: DISCONTINUED | OUTPATIENT
Start: 2022-06-03 | End: 2022-06-08 | Stop reason: HOSPADM

## 2022-06-03 RX ORDER — BUDESONIDE AND FORMOTEROL FUMARATE DIHYDRATE 160; 4.5 UG/1; UG/1
2 AEROSOL RESPIRATORY (INHALATION) 2 TIMES DAILY
Status: DISCONTINUED | OUTPATIENT
Start: 2022-06-03 | End: 2022-06-08 | Stop reason: HOSPADM

## 2022-06-03 RX ORDER — POLYETHYLENE GLYCOL 3350 17 G/17G
1 POWDER, FOR SOLUTION ORAL
Status: DISCONTINUED | OUTPATIENT
Start: 2022-06-03 | End: 2022-06-08 | Stop reason: HOSPADM

## 2022-06-03 RX ORDER — AMOXICILLIN 250 MG
2 CAPSULE ORAL 2 TIMES DAILY
Status: DISCONTINUED | OUTPATIENT
Start: 2022-06-03 | End: 2022-06-08 | Stop reason: HOSPADM

## 2022-06-03 RX ORDER — GUAIFENESIN/DEXTROMETHORPHAN 100-10MG/5
10 SYRUP ORAL EVERY 6 HOURS PRN
Status: DISCONTINUED | OUTPATIENT
Start: 2022-06-03 | End: 2022-06-08 | Stop reason: HOSPADM

## 2022-06-03 RX ORDER — MIDODRINE HYDROCHLORIDE 5 MG/1
10 TABLET ORAL
Status: DISCONTINUED | OUTPATIENT
Start: 2022-06-03 | End: 2022-06-04

## 2022-06-03 RX ADMIN — MIDODRINE HYDROCHLORIDE 10 MG: 5 TABLET ORAL at 22:16

## 2022-06-03 RX ADMIN — SODIUM BICARBONATE: 84 INJECTION, SOLUTION INTRAVENOUS at 21:07

## 2022-06-03 RX ADMIN — OMEPRAZOLE 20 MG: 20 CAPSULE, DELAYED RELEASE ORAL at 22:16

## 2022-06-03 RX ADMIN — HEPARIN SODIUM 18 UNITS/KG/HR: 5000 INJECTION, SOLUTION INTRAVENOUS at 22:17

## 2022-06-03 RX ADMIN — SEVELAMER CARBONATE 800 MG: 800 TABLET, FILM COATED ORAL at 22:17

## 2022-06-03 ASSESSMENT — ENCOUNTER SYMPTOMS
HEARTBURN: 1
BLURRED VISION: 0
HEADACHES: 0
COUGH: 0
NAUSEA: 1
FLANK PAIN: 0
BRUISES/BLEEDS EASILY: 0
DEPRESSION: 0
MYALGIAS: 0
CHILLS: 1
ABDOMINAL PAIN: 1
DIZZINESS: 0
PALPITATIONS: 0
SPEECH CHANGE: 0
FALLS: 0
WHEEZING: 0
FOCAL WEAKNESS: 0
WEAKNESS: 1
FEVER: 1
DOUBLE VISION: 0
VOMITING: 1

## 2022-06-03 ASSESSMENT — PATIENT HEALTH QUESTIONNAIRE - PHQ9
3. TROUBLE FALLING OR STAYING ASLEEP OR SLEEPING TOO MUCH: NOT AT ALL
1. LITTLE INTEREST OR PLEASURE IN DOING THINGS: SEVERAL DAYS
6. FEELING BAD ABOUT YOURSELF - OR THAT YOU ARE A FAILURE OR HAVE LET YOURSELF OR YOUR FAMILY DOWN: SEVERAL DAYS
SUM OF ALL RESPONSES TO PHQ9 QUESTIONS 1 AND 2: 2
9. THOUGHTS THAT YOU WOULD BE BETTER OFF DEAD, OR OF HURTING YOURSELF: NOT AT ALL
7. TROUBLE CONCENTRATING ON THINGS, SUCH AS READING THE NEWSPAPER OR WATCHING TELEVISION: NOT AT ALL
2. FEELING DOWN, DEPRESSED, IRRITABLE, OR HOPELESS: SEVERAL DAYS
5. POOR APPETITE OR OVEREATING: NOT AT ALL
8. MOVING OR SPEAKING SO SLOWLY THAT OTHER PEOPLE COULD HAVE NOTICED. OR THE OPPOSITE, BEING SO FIGETY OR RESTLESS THAT YOU HAVE BEEN MOVING AROUND A LOT MORE THAN USUAL: NOT AT ALL
4. FEELING TIRED OR HAVING LITTLE ENERGY: SEVERAL DAYS
SUM OF ALL RESPONSES TO PHQ QUESTIONS 1-9: 4

## 2022-06-03 ASSESSMENT — LIFESTYLE VARIABLES: SUBSTANCE_ABUSE: 0

## 2022-06-03 NOTE — PROGRESS NOTES
[facility]OSPITALIST TRIAGE OFFICER DIRECT ADMISSION REPORT  Transferring facility: [facility]  Transferring physician: Dr Doyle  Transferring facility/physician contact number:   Chief complaint: Unable to urinate for 3 days  Pertinent history & patient course: 67-year-old female with multiple medical problems, including CHF, CAD, CABG, CKD stage III, COPD/asthma, DVT/PE currently not on anticoagulation, chronic respiratory failure, came for outpatient MRI of the lumbar spine, found to be hypotensive, sent to outside facility ER, where she found to have acute kidney injury with creatinine 5.91, BUN 59, potassium 4.6.  Patient was given 1 L of crystalloids, blood pressure is stable now 106/60.  On room air.  Not in distress.  D-dimer was elevated at 3510.  Transferring physician was concerning about possible PE, started on subcu Lovenox.  Connell catheter was placed with return of [employer] colored urine, UA is pending.  Transfer requested for nephrology consult.  Pertinent imaging & lab results: WBC 13, bicarb 17, D-dimer 3510  Code Status: full per transferring provider, I personally verified with the transferring provider patient's code status and the transferring provider has confirmed this with the patient.  Further work up or recommendations per triage officer prior to transfer:   Consultants called prior to transfer and pertinent input from consultants:   Patient accepted for transfer: Yes  Consultants to be called upon arrival: nephro tomorrow  Admission status: Inpatient.   Floor requested: tele  If ICU transfer, name of intensivist case discussed with and pertinent input from critical care: n/a    Please inform the triage officer upon arrival of the patient to [facility] for assignment of a hospitalist to perform admission.     For any question or concerns regarding the care of this patient, please reach out to the assigned hospitalist.         Update 6/3:      Yanira called back this morning reporting that Mrs. Marx continues to have soft blood pressures and is concerned that the medical floor and it may not be the appropriate destination for his patient.    During the discussion it became clear that she is maintaining a mean arterial pressure greater than 65 with fluid boluses.  I emphasized that the MAP goal should be greater than 65 mmHg.  Levophed has been ordered if needed.    Given Dr. Doyle's assessment that she is declining I have recommended transfer to the intermediate care unit for higher level management.

## 2022-06-04 ENCOUNTER — APPOINTMENT (OUTPATIENT)
Dept: RADIOLOGY | Facility: MEDICAL CENTER | Age: 67
DRG: 871 | End: 2022-06-04
Attending: STUDENT IN AN ORGANIZED HEALTH CARE EDUCATION/TRAINING PROGRAM
Payer: COMMERCIAL

## 2022-06-04 PROBLEM — R63.4 WEIGHT LOSS, UNINTENTIONAL: Status: ACTIVE | Noted: 2022-06-04

## 2022-06-04 LAB
ALBUMIN SERPL BCP-MCNC: 3 G/DL (ref 3.2–4.9)
ALBUMIN/GLOB SERPL: 1 G/DL
ALP SERPL-CCNC: 49 U/L (ref 30–99)
ALT SERPL-CCNC: 24 U/L (ref 2–50)
ANION GAP SERPL CALC-SCNC: 15 MMOL/L (ref 7–16)
AST SERPL-CCNC: 23 U/L (ref 12–45)
BASOPHILS # BLD AUTO: 0.3 % (ref 0–1.8)
BASOPHILS # BLD: 0.03 K/UL (ref 0–0.12)
BILIRUB SERPL-MCNC: 0.2 MG/DL (ref 0.1–1.5)
BUN SERPL-MCNC: 47 MG/DL (ref 8–22)
CALCIUM SERPL-MCNC: 8.4 MG/DL (ref 8.5–10.5)
CHLORIDE SERPL-SCNC: 110 MMOL/L (ref 96–112)
CHOLEST SERPL-MCNC: 77 MG/DL (ref 100–199)
CO2 SERPL-SCNC: 18 MMOL/L (ref 20–33)
CORTIS SERPL-MCNC: 9.6 UG/DL (ref 0–23)
CREAT SERPL-MCNC: 2.81 MG/DL (ref 0.5–1.4)
CREAT UR-MCNC: 72.87 MG/DL
CREAT UR-MCNC: 75.77 MG/DL
CRP SERPL HS-MCNC: 21.41 MG/DL (ref 0–0.75)
EOSINOPHIL # BLD AUTO: 0.25 K/UL (ref 0–0.51)
EOSINOPHIL NFR BLD: 2.4 % (ref 0–6.9)
ERYTHROCYTE [DISTWIDTH] IN BLOOD BY AUTOMATED COUNT: 50.5 FL (ref 35.9–50)
ERYTHROCYTE [SEDIMENTATION RATE] IN BLOOD BY WESTERGREN METHOD: 75 MM/HOUR (ref 0–25)
EST. AVERAGE GLUCOSE BLD GHB EST-MCNC: 114 MG/DL
GFR SERPLBLD CREATININE-BSD FMLA CKD-EPI: 18 ML/MIN/1.73 M 2
GLOBULIN SER CALC-MCNC: 3 G/DL (ref 1.9–3.5)
GLUCOSE SERPL-MCNC: 117 MG/DL (ref 65–99)
HBA1C MFR BLD: 5.6 % (ref 4–5.6)
HCT VFR BLD AUTO: 34 % (ref 37–47)
HDLC SERPL-MCNC: 21 MG/DL
HGB BLD-MCNC: 11.1 G/DL (ref 12–16)
IMM GRANULOCYTES # BLD AUTO: 0.15 K/UL (ref 0–0.11)
IMM GRANULOCYTES NFR BLD AUTO: 1.4 % (ref 0–0.9)
LDH SERPL L TO P-CCNC: 121 U/L (ref 107–266)
LDLC SERPL CALC-MCNC: 34 MG/DL
LYMPHOCYTES # BLD AUTO: 1.42 K/UL (ref 1–4.8)
LYMPHOCYTES NFR BLD: 13.4 % (ref 22–41)
MAGNESIUM SERPL-MCNC: 1.8 MG/DL (ref 1.5–2.5)
MCH RBC QN AUTO: 30.2 PG (ref 27–33)
MCHC RBC AUTO-ENTMCNC: 32.6 G/DL (ref 33.6–35)
MCV RBC AUTO: 92.4 FL (ref 81.4–97.8)
MONOCYTES # BLD AUTO: 0.97 K/UL (ref 0–0.85)
MONOCYTES NFR BLD AUTO: 9.1 % (ref 0–13.4)
NEUTROPHILS # BLD AUTO: 7.79 K/UL (ref 2–7.15)
NEUTROPHILS NFR BLD: 73.4 % (ref 44–72)
NRBC # BLD AUTO: 0 K/UL
NRBC BLD-RTO: 0 /100 WBC
PHOSPHATE SERPL-MCNC: 3.1 MG/DL (ref 2.5–4.5)
PLATELET # BLD AUTO: 196 K/UL (ref 164–446)
PMV BLD AUTO: 10 FL (ref 9–12.9)
POTASSIUM SERPL-SCNC: 4 MMOL/L (ref 3.6–5.5)
PROCALCITONIN SERPL-MCNC: 0.65 NG/ML
PROCALCITONIN SERPL-MCNC: 0.94 NG/ML
PROT SERPL-MCNC: 6 G/DL (ref 6–8.2)
PROT UR-MCNC: 34 MG/DL (ref 0–15)
PROT UR-MCNC: 35 MG/DL (ref 0–15)
PROT/CREAT UR: 449 MG/G (ref 10–107)
RBC # BLD AUTO: 3.68 M/UL (ref 4.2–5.4)
SODIUM SERPL-SCNC: 143 MMOL/L (ref 135–145)
SODIUM UR-SCNC: 38 MMOL/L
TRIGL SERPL-MCNC: 111 MG/DL (ref 0–149)
UFH PPP CHRO-ACNC: 0.47 IU/ML
UFH PPP CHRO-ACNC: 0.85 IU/ML
UFH PPP CHRO-ACNC: 0.89 IU/ML
URATE SERPL-MCNC: 11.2 MG/DL (ref 1.9–8.2)
WBC # BLD AUTO: 10.6 K/UL (ref 4.8–10.8)

## 2022-06-04 PROCEDURE — 83615 LACTATE (LD) (LDH) ENZYME: CPT

## 2022-06-04 PROCEDURE — 770000 HCHG ROOM/CARE - INTERMEDIATE ICU *

## 2022-06-04 PROCEDURE — 700101 HCHG RX REV CODE 250: Performed by: STUDENT IN AN ORGANIZED HEALTH CARE EDUCATION/TRAINING PROGRAM

## 2022-06-04 PROCEDURE — 700102 HCHG RX REV CODE 250 W/ 637 OVERRIDE(OP): Performed by: STUDENT IN AN ORGANIZED HEALTH CARE EDUCATION/TRAINING PROGRAM

## 2022-06-04 PROCEDURE — 76775 US EXAM ABDO BACK WALL LIM: CPT

## 2022-06-04 PROCEDURE — 99223 1ST HOSP IP/OBS HIGH 75: CPT | Performed by: INTERNAL MEDICINE

## 2022-06-04 PROCEDURE — 99233 SBSQ HOSP IP/OBS HIGH 50: CPT | Performed by: HOSPITALIST

## 2022-06-04 PROCEDURE — A9270 NON-COVERED ITEM OR SERVICE: HCPCS | Performed by: STUDENT IN AN ORGANIZED HEALTH CARE EDUCATION/TRAINING PROGRAM

## 2022-06-04 PROCEDURE — 86140 C-REACTIVE PROTEIN: CPT

## 2022-06-04 PROCEDURE — 84156 ASSAY OF PROTEIN URINE: CPT | Mod: 91

## 2022-06-04 PROCEDURE — 93005 ELECTROCARDIOGRAM TRACING: CPT | Performed by: STUDENT IN AN ORGANIZED HEALTH CARE EDUCATION/TRAINING PROGRAM

## 2022-06-04 PROCEDURE — 84145 PROCALCITONIN (PCT): CPT

## 2022-06-04 PROCEDURE — 85652 RBC SED RATE AUTOMATED: CPT

## 2022-06-04 PROCEDURE — A9567 TECHNETIUM TC-99M AEROSOL: HCPCS

## 2022-06-04 PROCEDURE — 94760 N-INVAS EAR/PLS OXIMETRY 1: CPT

## 2022-06-04 PROCEDURE — 85520 HEPARIN ASSAY: CPT | Mod: 91

## 2022-06-04 PROCEDURE — 84300 ASSAY OF URINE SODIUM: CPT

## 2022-06-04 PROCEDURE — 700111 HCHG RX REV CODE 636 W/ 250 OVERRIDE (IP): Performed by: STUDENT IN AN ORGANIZED HEALTH CARE EDUCATION/TRAINING PROGRAM

## 2022-06-04 PROCEDURE — 82570 ASSAY OF URINE CREATININE: CPT | Mod: 91

## 2022-06-04 RX ADMIN — BUDESONIDE AND FORMOTEROL FUMARATE DIHYDRATE 2 PUFF: 160; 4.5 AEROSOL RESPIRATORY (INHALATION) at 06:14

## 2022-06-04 RX ADMIN — MIDODRINE HYDROCHLORIDE 10 MG: 5 TABLET ORAL at 07:46

## 2022-06-04 RX ADMIN — SEVELAMER CARBONATE 800 MG: 800 TABLET, FILM COATED ORAL at 11:47

## 2022-06-04 RX ADMIN — HEPARIN SODIUM 14 UNITS/KG/HR: 5000 INJECTION, SOLUTION INTRAVENOUS at 22:37

## 2022-06-04 RX ADMIN — SEVELAMER CARBONATE 800 MG: 800 TABLET, FILM COATED ORAL at 06:14

## 2022-06-04 RX ADMIN — BUDESONIDE AND FORMOTEROL FUMARATE DIHYDRATE 2 PUFF: 160; 4.5 AEROSOL RESPIRATORY (INHALATION) at 00:18

## 2022-06-04 RX ADMIN — ATORVASTATIN CALCIUM 40 MG: 40 TABLET, FILM COATED ORAL at 00:08

## 2022-06-04 RX ADMIN — SEVELAMER CARBONATE 800 MG: 800 TABLET, FILM COATED ORAL at 17:21

## 2022-06-04 RX ADMIN — SODIUM BICARBONATE: 84 INJECTION, SOLUTION INTRAVENOUS at 11:22

## 2022-06-04 RX ADMIN — ATORVASTATIN CALCIUM 40 MG: 40 TABLET, FILM COATED ORAL at 17:21

## 2022-06-04 RX ADMIN — HEPARIN SODIUM 18 UNITS/KG/HR: 5000 INJECTION, SOLUTION INTRAVENOUS at 09:24

## 2022-06-04 RX ADMIN — OXYCODONE HYDROCHLORIDE 10 MG: 10 TABLET ORAL at 00:08

## 2022-06-04 RX ADMIN — LEVOTHYROXINE SODIUM 100 MCG: 0.1 TABLET ORAL at 06:14

## 2022-06-04 RX ADMIN — ACETAMINOPHEN 650 MG: 325 TABLET ORAL at 15:39

## 2022-06-04 RX ADMIN — OXYCODONE HYDROCHLORIDE 10 MG: 10 TABLET ORAL at 22:39

## 2022-06-04 RX ADMIN — CEFTRIAXONE SODIUM 2 G: 10 INJECTION, POWDER, FOR SOLUTION INTRAVENOUS at 06:14

## 2022-06-04 RX ADMIN — ASPIRIN 81 MG: 81 TABLET, COATED ORAL at 06:14

## 2022-06-04 RX ADMIN — MIDODRINE HYDROCHLORIDE 10 MG: 5 TABLET ORAL at 11:47

## 2022-06-04 RX ADMIN — SERTRALINE 100 MG: 100 TABLET, FILM COATED ORAL at 06:14

## 2022-06-04 RX ADMIN — ONDANSETRON 4 MG: 2 INJECTION INTRAMUSCULAR; INTRAVENOUS at 11:47

## 2022-06-04 RX ADMIN — OMEPRAZOLE 20 MG: 20 CAPSULE, DELAYED RELEASE ORAL at 17:21

## 2022-06-04 RX ADMIN — OXYCODONE HYDROCHLORIDE 10 MG: 10 TABLET ORAL at 16:31

## 2022-06-04 RX ADMIN — OXYCODONE HYDROCHLORIDE 10 MG: 10 TABLET ORAL at 10:21

## 2022-06-04 RX ADMIN — BUDESONIDE AND FORMOTEROL FUMARATE DIHYDRATE 2 PUFF: 160; 4.5 AEROSOL RESPIRATORY (INHALATION) at 18:00

## 2022-06-04 RX ADMIN — OMEPRAZOLE 20 MG: 20 CAPSULE, DELAYED RELEASE ORAL at 06:14

## 2022-06-04 ASSESSMENT — LIFESTYLE VARIABLES
HOW MANY TIMES IN THE PAST YEAR HAVE YOU HAD 5 OR MORE DRINKS IN A DAY: 0
TOTAL SCORE: 0
CONSUMPTION TOTAL: NEGATIVE
HAVE PEOPLE ANNOYED YOU BY CRITICIZING YOUR DRINKING: NO
AVERAGE NUMBER OF DAYS PER WEEK YOU HAVE A DRINK CONTAINING ALCOHOL: 0
EVER HAD A DRINK FIRST THING IN THE MORNING TO STEADY YOUR NERVES TO GET RID OF A HANGOVER: NO
DOES PATIENT WANT TO STOP DRINKING: NO
EVER FELT BAD OR GUILTY ABOUT YOUR DRINKING: NO
ON A TYPICAL DAY WHEN YOU DRINK ALCOHOL HOW MANY DRINKS DO YOU HAVE: 0
ALCOHOL_USE: NO
TOTAL SCORE: 0
HAVE YOU EVER FELT YOU SHOULD CUT DOWN ON YOUR DRINKING: NO
TOTAL SCORE: 0

## 2022-06-04 ASSESSMENT — PATIENT HEALTH QUESTIONNAIRE - PHQ9
9. THOUGHTS THAT YOU WOULD BE BETTER OFF DEAD, OR OF HURTING YOURSELF: NOT AT ALL
7. TROUBLE CONCENTRATING ON THINGS, SUCH AS READING THE NEWSPAPER OR WATCHING TELEVISION: NOT AT ALL
3. TROUBLE FALLING OR STAYING ASLEEP OR SLEEPING TOO MUCH: NOT AT ALL
1. LITTLE INTEREST OR PLEASURE IN DOING THINGS: SEVERAL DAYS
SUM OF ALL RESPONSES TO PHQ9 QUESTIONS 1 AND 2: 2
5. POOR APPETITE OR OVEREATING: NOT AT ALL
SUM OF ALL RESPONSES TO PHQ QUESTIONS 1-9: 4
2. FEELING DOWN, DEPRESSED, IRRITABLE, OR HOPELESS: SEVERAL DAYS
8. MOVING OR SPEAKING SO SLOWLY THAT OTHER PEOPLE COULD HAVE NOTICED. OR THE OPPOSITE, BEING SO FIGETY OR RESTLESS THAT YOU HAVE BEEN MOVING AROUND A LOT MORE THAN USUAL: NOT AT ALL
6. FEELING BAD ABOUT YOURSELF - OR THAT YOU ARE A FAILURE OR HAVE LET YOURSELF OR YOUR FAMILY DOWN: SEVERAL DAYS
4. FEELING TIRED OR HAVING LITTLE ENERGY: SEVERAL DAYS

## 2022-06-04 ASSESSMENT — PAIN DESCRIPTION - PAIN TYPE
TYPE: ACUTE PAIN
TYPE: CHRONIC PAIN
TYPE: ACUTE PAIN

## 2022-06-04 ASSESSMENT — ENCOUNTER SYMPTOMS
ABDOMINAL PAIN: 0
SHORTNESS OF BREATH: 0
WEIGHT LOSS: 1
FEVER: 0

## 2022-06-04 ASSESSMENT — FIBROSIS 4 INDEX: FIB4 SCORE: 1.58

## 2022-06-04 NOTE — ASSESSMENT & PLAN NOTE
Septic vs hypovolemic shock  Pyuria, leukocytosis, hypotension, acute organ failure  S/p 7L IVF resuscitation, started on Leophed prior to transfer   Initiated on midodrine, weaned off levophed  Abx: ceftriaxone for 3 days

## 2022-06-04 NOTE — CARE PLAN
The patient is Stable - Low risk of patient condition declining or worsening    Shift Goals  Clinical Goals: Improve BP, decrease vasopressor support  Patient Goals: rest  Family Goals: not at bedside    Progress made toward(s) clinical / shift goals: no vasopressor support needed, VSS.      Problem: Hemodynamics  Goal: Patient's hemodynamics, fluid balance and neurologic status will be stable or improve  Outcome: Progressing   No vasopressor support needed.  Problem: Urinary - Renal Perfusion  Goal: Ability to achieve and maintain adequate renal perfusion and functioning will improve  Outcome: Progressing   Improved urine outcome.  Problem: Respiratory  Goal: Patient will achieve/maintain optimum respiratory ventilation and gas exchange  Outcome: Progressing   2LNC
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
## 2022-06-04 NOTE — PROGRESS NOTES
4 Eyes Skin Assessment Completed by LEW Schulz and LEW Ramos.    Head WDL  Ears WDL  Nose WDL  Mouth WDL  Neck WDL  Breast/Chest Scab  Shoulder Blades WDL  Spine WDL  (R) Arm/Elbow/Hand Scab  (L) Arm/Elbow/Hand Scab  Abdomen WDL  Groin Redness and Excoriation  Scrotum/Coccyx/Buttocks Redness, Blanching and Non-Blanching  (R) Leg Swelling  (L) Leg Swelling  (R) Heel/Foot/Toe Redness and Blanching  (L) Heel/Foot/Toe Redness, Blanching and Boggy          Devices In Places Blood Pressure Cuff, Pulse Ox, Connell and Nasal Cannula      Interventions In Place Sacral Mepilex and Low Air Loss Mattress    Possible Skin Injury Yes    Pictures Uploaded Into Epic Yes  Wound Consult Placed Yes  RN Wound Prevention Protocol Ordered Yes

## 2022-06-04 NOTE — H&P
"Hospital Medicine History & Physical Note    Date of Service  6/3/2022    Primary Care Physician  JAMES De Anda.    Consultants  Nephrology (Dr. Meyer)    Code Status  Partial Code    Chief Complaint  No chief complaint on file.  weakness, oligouria    History of Presenting Illness  Johana Marx is a 67 y.o. female with history of CHF, CAD s/p CABG, CKD III, COPD, prior DVT/PE off Warfarin for 3months  who presented 6/3/2022 [facility] (referred from outpatient PCP), found to have septic shock, transferred to [facility] as direct admit for higher level of care.  Patient reported having abdominal cramping for the past 3 weeks, reported decreased urine output which she describes as \"spoting.\"  She does endorse dysuria hematuria, generalized weakness associated with subjective chills.  She was seen at PCP 6/2, sent to [place] [facility] for evaluation, subsequently sent to [facility] as direct admit for higher level of care, needing nephrology evaluation.    Work up and treatment at [place] [facility]:  Labs 6/2/2022  @16:19  CBC -WBC 13.25, globin 13.2, platelet 198  CMP- sodium 132, potassium 4.6, chloride 99 bicarb 13 BUN 59, creatinine 5.91, albumin 3.1, total bilirubin 0.8, alkaline phosphatase 50, AST 40 ALT 30  INR 1.20  CPK 82, CK-MB 4.0, troponin 14.2 (WNL), , D-dimer 3510 (ULN >400)  Magnesium 2.2, phosphorus 5.6  TSH 2.15, T3 40    Labs 6/3/2022 @06:46  CBC-WBC 9.2, hemoglobin, platelet 146  CMP-sodium 135, potassium 4.7, BUN 65 4.81 albumin 2.0, total bilirubin 0 AST 16,  INR 1.24 magnesium 1.8, phosphorus 5.1  Lactic acid 0.4, CRP    CXR 6/2/2022 @16:10 -no acute cardiopulmonary process  CTAP without contrast 6/2/2022 @16:10 -no acute abdominal or pelvic pathology, minimal diverticulosis, hysterectomy and cholecystectomy    Patient was reported to have been hypotensive, UA pyuria.  Patient also reported to have [employer] drug urine, Connell was placed.  Patient was found to have " pyuria, and hypotensive.  Per report, patient received 7L IVF resuscitation, still hypotensive and therefore initiated on Levophed.  Due to elevated D-dimer, patient was given therapeutic Lovenox at 120mg. She also received 1amp bicarb push and was on bicarb gtt. Received ceftriaxone.    Case was discussed with Dr. Gordon, who recommended IMCU transfer.  [facility]. At time of evaluation, patient appears comfortable in no acute distress.  Currently running on Levophed. Will continue on bicarb gtt, heparin gtt, ceftriaxone. Initiate on midodrine, wean off Levophed. I consulted nephrology, formal evaluation to follow.    I discussed the plan of care with patient, bedside RN and nephrology.    Review of Systems  Review of Systems   Constitutional: Positive for chills, fever and malaise/fatigue.   HENT: Negative for hearing loss and tinnitus.    Eyes: Negative for blurred vision and double vision.   Respiratory: Negative for cough and wheezing.    Cardiovascular: Negative for chest pain and palpitations.   Gastrointestinal: Positive for abdominal pain, heartburn, nausea and vomiting.   Genitourinary: Positive for dysuria, hematuria and urgency. Negative for flank pain and frequency.   Musculoskeletal: Negative for falls and myalgias.   Skin: Negative for itching and rash.   Neurological: Positive for weakness. Negative for dizziness, speech change, focal weakness and headaches.   Endo/Heme/Allergies: Negative for environmental allergies. Does not bruise/bleed easily.   Psychiatric/Behavioral: Negative for depression and substance abuse.   All other systems reviewed and are negative.      Past Medical History   has a past medical history of Angina, Anxiety, Arthritis, ASTHMA, Dental disorder, DVT (deep venous thrombosis) ([facility]), HTN (hypertension) (5/24/2011), Morbid obesity (HCC) (5/24/2011), Psychiatric problem, Unspecified disorder of thyroid, and Unspecified urinary incontinence.   Prior  CVA  Prior b/l DVT and PE, off warfarin for 3months    Surgical History   has a past surgical history that includes other abdominal surgery (1979); other abdominal surgery (1990); recovery (5/25/2011); and multiple coronary artery bypass endo vein harvest (5/26/2011).     Family History  family history includes Diabetes in her unknown relative; Heart Attack in her father and sister.   Family history reviewed with patient. There is family history that is pertinent to the chief complaint.    FH of CAD  Denies FH of dialysis    Social History   reports that she quit smoking about 46 years ago. Her smoking use included cigarettes. She does not have any smokeless tobacco history on file. She reports that she does not drink alcohol and does not use drugs.    Allergies  Allergies   Allergen Reactions   • Strawberry Unspecified     Bumps on tongue and throat swelling   • Codeine Vomiting and Nausea       Medications  Prior to Admission Medications   Prescriptions Last Dose Informant Patient Reported? Taking?   ARIPiprazole (ABILIFY) 10 MG Tab  Patient's Home Pharmacy Yes No   Sig: Take 10 mg by mouth every day.   B Complex Vitamins (B COMPLEX PO)  Patient Yes No   Sig: Take 1 Tab by mouth every evening.   BIOTIN PO  Patient Yes No   Sig: Take 1 Tab by mouth every evening.   BLACK COHOSH PO  Patient Yes No   Sig: Take 1 Tab by mouth every evening.   Calcium-Vitamin D (CALCIUM + D PO)  Patient Yes No   Sig: Take 1 Tab by mouth every evening.   Misc Natural Products (OSTEO BI-FLEX ADV DOUBLE ST PO)  Patient Yes No   Sig: Take 1 Tab by mouth 2 Times a Day.   Multiple Vitamins-Minerals (ONE-A-DAY WOMENS 50 PLUS PO)  Patient Yes No   Sig: Take 1 Tab by mouth every day.   Probiotic Product (PROBIOTIC PO)  Patient Yes No   Sig: Take 1 Cap by mouth every evening.   aspirin 81 MG tablet  Patient Yes No   Sig: Take 81 mg by mouth every day.   benazepril (LOTENSIN) 40 MG tablet  Patient No No   Sig: Take 1 Tab by mouth every day. Needs  follow up for future refills   budesonide-formoterol (SYMBICORT) 160-4.5 MCG/ACT Aerosol  Patient Yes No   Sig: Inhale 2 Puffs by mouth 2 Times a Day. Sample from doctor   fluconazole (DIFLUCAN) 150 MG tablet   No No   Sig: Take 1 Tab by mouth 1 time daily as needed (thrush, yeast infection).   fluticasone (FLONASE) 50 MCG/ACT nasal spray  Patient's Home Pharmacy Yes No   Sig: [place] 1 Spray in nose every day.   furosemide (LASIX) 20 MG TABS  Patient Yes No   Sig: Take 20 mg by mouth every day.   ibuprofen (MOTRIN) 400 MG Tab   No No   Sig: Take 1 Tab by mouth every 6 hours as needed for Moderate Pain.   isosorbide mononitrate SR (IMDUR) 30 MG TB24  Patient No No   Sig: Take 1 Tab by mouth every day.   levothyroxine (SYNTHROID) 100 MCG Tab  Patient Yes No   Sig: Take 100 mcg by mouth every day.   metoprolol (LOPRESSOR) 25 MG TABS  Patient Yes No   Sig: Take 25 mg by mouth 2 times a day.   mirtazapine (REMERON) 15 MG Tab  Patient's Home Pharmacy Yes No   Sig: Take 15 mg by mouth every bedtime.   ondansetron (ZOFRAN ODT) 4 MG TABLET DISPERSIBLE   No No   Sig: Take 1 Tab by mouth every four hours as needed for Nausea (give PO if no IV route available).   oxyCODONE-acetaminophen (PERCOCET-10)  MG Tab  Patient's Home Pharmacy Yes No   Sig: Take 1 Tab by mouth every 6 hours as needed for Severe Pain.   pantoprazole (PROTONIX) 40 MG Tablet Delayed Response  Patient Yes No   Sig: Take 40 mg by mouth every day.   potassium chloride (KLOR-CON) 20 MEQ PACK  Patient Yes No   Sig: Take 20 mEq by mouth every day.   propranolol LA (INDERAL LA) 60 MG CAPSULE SR 24 HR  Patient Yes No   Sig: Take 60 mg by mouth every day.   sertraline (ZOLOFT) 100 MG Tab  Patient's Home Pharmacy Yes No   Sig: Take 100 mg by mouth every day.   simvastatin (ZOCOR) 20 MG TABS  Patient Yes No   Sig: Take 20 mg by mouth every evening.   warfarin (COUMADIN) 3 MG Tab  Patient Yes No   Sig: Take 3-4 mg by mouth every evening.      Facility-Administered  Medications: None       Physical Exam  Pulse:  [70-90] 70  Resp:  [24-29] 27  BP: (114-140)/(58-73) 135/64  SpO2:  [95 %-98 %] 95 %                          Physical Exam  Vitals and nursing note reviewed.   Constitutional:       Appearance: She is obese. She is ill-appearing.   HENT:      Head: Normocephalic and atraumatic.      Nose: Nose normal.      Mouth/Throat:      Mouth: Mucous membranes are dry.      Pharynx: Oropharynx is clear.   Eyes:      General: No scleral icterus.     Extraocular Movements: Extraocular movements intact.   Cardiovascular:      Rate and Rhythm: Normal rate and regular rhythm.      Pulses: Normal pulses.      Heart sounds:     No friction rub.   Pulmonary:      Effort: Pulmonary effort is normal. No respiratory distress.      Breath sounds: No stridor. No wheezing or rales.   Abdominal:      General: Bowel sounds are normal. There is distension.      Palpations: Abdomen is soft.      Tenderness: There is no abdominal tenderness. There is no guarding or rebound.   Musculoskeletal:         General: No tenderness. Normal range of motion.      Cervical back: Neck supple. No tenderness.      Right lower leg: Edema present.      Left lower leg: Edema present.   Skin:     General: Skin is warm and dry.      Capillary Refill: Capillary refill takes less than 2 seconds.      Coloration: Skin is pale.   Neurological:      General: No focal deficit present.      Mental Status: She is alert and oriented to person, place, and time.      Motor: Weakness present.   Psychiatric:         Mood and Affect: Mood normal.         Laboratory:          No results for input(s): ALTSGPT, ASTSGOT, ALKPHOSPHAT, TBILIRUBIN, DBILIRUBIN, GAMMAGT, AMYLASE, LIPASE, ALB, PREALBUMIN, GLUCOSE in the last 72 hours.  Recent Labs     06/03/22  2100   APTT 29.7   INR 1.45*     No results for input(s): NTPROBNP in the last 72 hours.      No results for input(s): TROPONINT in the last 72 hours.    Imaging:  DX-CHEST-PORTABLE (1  VIEW)   Final Result         1. No acute cardiopulmonary abnormalities are identified.      OUTSIDE IMAGES-DX ABDOMEN   Final Result      OUTSIDE IMAGES-MR LUMBAR SPINE   Final Result      OUTSIDE IMAGES-DX CHEST   Final Result      MR-FOREIGN FILM MRI   Final Result      OUTSIDE IMAGES-CT ABDOMEN /PELVIS   Final Result      US-RENAL    (Results Pending)   EC-ECHOCARDIOGRAM COMPLETE W/O CONT    (Results Pending)   US-EXTREMITY VENOUS LOWER BILAT    (Results Pending)   NM-LUNG VENT/PERF IMAGING    (Results Pending)       X-Ray:  I have personally reviewed the images and compared with prior images.  EKG:  I have personally reviewed the images and compared with prior images.    Assessment/Plan:  Justification for Admission Status  I anticipate this patient is appropriate for inpatient status    * Shock ([facility])  Assessment & Plan  Septic vs hypovolemic shock  Pyuria, leukocytosis, hypotension, acute organ failure  S/p 7L IVF resuscitation, started on Leophed prior to transfer -- still on -- wean off as tolerated  Initiated on midodrine, wean off levophed  IVF  Abx: ceftriaxone  F/u UCx, BCx    Acute kidney injury superimposed on chronic kidney disease ([facility])  Assessment & Plan  LALITHA on CKD III  Likely prerenal given hypotension  F/u FeNa  IVF  Bicarb  Connell -- exchanged at [facility] arrival  No hydronephrosis or nephrolithiasis seen on CTAP at another facility  Minimize nephrotoxic agents, renally dose meds    COPD (chronic obstructive pulmonary disease) ([facility])  Assessment & Plan  Continue Symbicort  PRN Duonebs  Not in acute exacerbation    Hematuria  Assessment & Plan  resolving    Acute cystitis with hematuria  Assessment & Plan  IVF  Abx: ceftriaxone  F/u UCx, BCx    Acute on chronic respiratory failure with hypoxia ([facility])  Assessment & Plan  Reported nocturnal oxygen use  Currently on 2L -- wean off as tolerated    VTE (venous thromboembolism)  Assessment & Plan  History of bilateral DVT and PE, been off warfarin for 3  months  Elevated D-dimer at another facility, was empirically started on therapeutic Lovenox    F/u VQ scan, LE doppler  Heparin gtt -- d/c if no VTE seen    Hypothyroid  Assessment & Plan  Synthroid    ACP (advance care planning)  Assessment & Plan  Discussed GOC with pt in [facility] -- she does not wish to be intubated, but ok with CPR/defibrillation/chemicial resuscitation. Partial code status updated  ACP: 16mins    Dyslipidemia- (present on admission)  Assessment & Plan  Statin    HTN (hypertension)- (present on admission)  Assessment & Plan  Hold home meds given shock state  Resume when appropriate    Hx of CABG- (present on admission)  Assessment & Plan  ASA/statin  Resume other CAD meds when out of shock state    CAD (coronary artery disease)  Assessment & Plan  As noted in h/o CABG    Obesity, morbid (HCC)- (present on admission)  Assessment & Plan  Diet and lifestyle modification      VTE prophylaxis: heparin gtt    Critical care time: 40 minutes spent in chart review, aggressive medical management, coordinating patient care with RN/RT/pharmacy/consultant providers

## 2022-06-04 NOTE — CONSULTS
Nephrology Consultation    Date of Service  6/4/2022    Referring Physician  Johnnie Holly M.D.    Consulting Physician  Billy Meyer M.D.    Reason for Consultation  LALITHA    History of Presenting Illness  67 y.o. female with a history of hypertension, asthma who presented 6/3/2022 with LALITHA.    The patient says over the past few weeks she has had some symptoms of dysuria and urinary frequency.  She decided to ignore the symptoms because she was looking forward to a family member's graduation in [place] on Memorial Day weekend.  The patient went to the celebration, and then came back to [facility].  She then remembers getting sicker, and not feeling well, and thinks she did not urinate much for 2 days prior to eventually going to the emergency room.  She was found to have LALITHA and hypotension, and sepsis, likely from UTI.  The patient was given volume resuscitation, and started on peripheral Levophed at one-point.  She was then transferred to our hospital for further evaluation and management.  Fortunately, upon transfer, the patient was urinating, and her kidney function improved overnight.    On my evaluation, the patient currently denies any symptoms such as headache, nausea, vomiting, chest pain, shortness of breath.  She does admit to about 50 pound weight loss in the last 6 months, unintentional.  She mentions she was taking the same blood pressure medicines that she was prescribed when she was 50 pounds heavier, and has noted that her blood pressure has been lower recently.    Review of Systems  Review of Systems   Constitutional: Positive for malaise/fatigue and weight loss. Negative for fever.   Respiratory: Negative for shortness of breath.    Cardiovascular: Negative for chest pain.   Gastrointestinal: Negative for abdominal pain.   Genitourinary: Positive for dysuria and urgency.   All other systems reviewed and are negative.      Past Medical History  Past Medical History:   Diagnosis Date   •  Angina    • Anxiety    • Arthritis     ankles, back, shoulders, knees   • ASTHMA    • Dental disorder     dentures   • DVT (deep venous thrombosis) ([facility])    • HTN (hypertension) 2011   • Morbid obesity (HCC) 2011   • Psychiatric problem     depression   • Unspecified disorder of thyroid    • Unspecified urinary incontinence        Surgical History  Past Surgical History:   Procedure Laterality Date   • MULTIPLE CORONARY ARTERY BYPASS ENDO VEIN HARVEST  2011    Performed by ROBERT FLOWERS at SURGERY [facility] ORS   • RECOVERY  2011    Performed by MAGALIS SHELDON-RECOVERY at SURGERY SAME DAY [facility] ORS   • OTHER ABDOMINAL SURGERY      sanket   • OTHER ABDOMINAL SURGERY      appy       Family History  Family History   Problem Relation Age of Onset   • Heart Attack Father    • Heart Attack Sister    • Diabetes Other    • Kidney Disease Neg Hx        Social History  Social History     Socioeconomic History   • Marital status:      Spouse name: Not on file   • Number of children: Not on file   • Years of education: Not on file   • Highest education level: Not on file   Occupational History   • Not on file   Tobacco Use   • Smoking status: Former Smoker     Types: Cigarettes     Quit date: 1976     Years since quittin.0   • Smokeless tobacco: Not on file   Substance and Sexual Activity   • Alcohol use: No   • Drug use: No   • Sexual activity: Not on file     Comment: na   Other Topics Concern   • Not on file   Social History Narrative   • Not on file     Social Determinants of Health     Financial Resource Strain: Not on file   Food Insecurity: Not on file   Transportation Needs: Not on file   Physical Activity: Not on file   Stress: Not on file   Social Connections: Not on file   Intimate Partner Violence: Not on file   Housing Stability: Not on file       Medications  Current Facility-Administered Medications   Medication Dose Route Frequency Provider Last Rate Last Admin    • senna-docusate (PERICOLACE or SENOKOT S) 8.6-50 MG per tablet 2 Tablet  2 Tablet Oral BID Beto Cardoza M.D.        And   • polyethylene glycol/lytes (MIRALAX) PACKET 1 Packet  1 Packet Oral QDAY PRN Beto Cardoza M.D.        And   • magnesium hydroxide (MILK OF MAGNESIA) suspension 30 mL  30 mL Oral QDAY PRN Beto Cardoza M.D.        And   • bisacodyl (DULCOLAX) suppository 10 mg  10 mg Rectal QDAY PRN Beto Cardoza M.D.       • lactated ringers infusion (BOLUS)  500 mL Intravenous Once PRN Beto Cardoza M.D.       • acetaminophen (Tylenol) tablet 650 mg  650 mg Oral Q6HRS PRN Beto Cardoza M.D.       • hydrALAZINE (APRESOLINE) injection 10 mg  10 mg Intravenous Q4HRS PRN Beto Cardoza M.D.       • ondansetron (ZOFRAN) syringe/vial injection 4 mg  4 mg Intravenous Q4HRS PRN Beto Cardoza M.D.   4 mg at 06/04/22 1147   • ondansetron (ZOFRAN ODT) dispertab 4 mg  4 mg Oral Q4HRS PRN Beto Cardoza M.D.       • guaiFENesin dextromethorphan (ROBITUSSIN DM) 100-10 MG/5ML syrup 10 mL  10 mL Oral Q6HRS PRN Beto Cardoza M.D.       • Pharmacy Consult Request - to monitor for nephrotoxic agents  1 Each Other PHARMACY TO DOSE Beto Cardoza M.D.       • sevelamer carbonate (RENVELA) tablet 800 mg  800 mg Oral TID WITH MEALS Beto Cardoza M.D.   800 mg at 06/04/22 1147   • sodium bicarbonate 150 mEq in D5W infusion (premix)   Intravenous Continuous Beto Cardoza M.D. 75 mL/hr at 06/04/22 1122 New Bag at 06/04/22 1122   • cefTRIAXone (Rocephin) syringe 2 g  2 g Intravenous Q24HRS Beto Cardoza M.D.   2 g at 06/04/22 0614   • heparin infusion 25,000 units in 500 mL 0.45% NACL  0-30 Units/kg/hr Intravenous Continuous Beto Cardoza M.D. 41 mL/hr at 06/04/22 0931 16 Units/kg/hr at 06/04/22 0931   • heparin injection 5,000 Units  5,000 Units Intravenous PRN Beto Cardoza M.D.       • omeprazole (PRILOSEC) capsule 20 mg  20 mg Oral BID Beto Cardoza M.D.   20 mg at 06/04/22 0614   • aspirin EC (ECOTRIN) tablet 81 mg  81 mg Oral  DAILY Beto Cardoza M.D.   81 mg at 06/04/22 0614   • levothyroxine (SYNTHROID) tablet 100 mcg  100 mcg Oral DAILY Beto Cardoza M.D.   100 mcg at 06/04/22 0614   • sertraline (Zoloft) tablet 100 mg  100 mg Oral DAILY Beto Cardoza M.D.   100 mg at 06/04/22 0614   • atorvastatin (LIPITOR) tablet 40 mg  40 mg Oral Q EVENING Beto Cardoza M.D.   40 mg at 06/04/22 0008   • budesonide-formoterol (SYMBICORT) 160-4.5 MCG/ACT inhaler 2 Puff  2 Puff Inhalation BID Beto Cardoza M.D.   2 Puff at 06/04/22 0614   • oxyCODONE immediate release (ROXICODONE) tablet 10 mg  10 mg Oral Q6HRS PRN Sylvain Andrews M.D.   10 mg at 06/04/22 1021       Allergies  Allergies   Allergen Reactions   • Strawberry Unspecified     Bumps on tongue and throat swelling   • Codeine Vomiting and Nausea       Physical Exam  Temp:  [36.1 °C (97 °F)-36.7 °C (98.1 °F)] 36.2 °C (97.2 °F)  Pulse:  [70-99] 83  Resp:  [18-29] 22  BP: (101-152)/(48-73) 145/66  SpO2:  [88 %-100 %] 97 %    Physical Exam  Constitutional:       General: She is not in acute distress.     Appearance: She is well-developed. She is obese.   HENT:      Head: Normocephalic and atraumatic.      Mouth/Throat:      Mouth: Mucous membranes are moist.      Pharynx: Oropharynx is clear. No oropharyngeal exudate.   Eyes:      General: No scleral icterus.     Extraocular Movements: Extraocular movements intact.   Neck:      Trachea: No tracheal deviation.   Cardiovascular:      Rate and Rhythm: Normal rate and regular rhythm.      Heart sounds: Normal heart sounds. No murmur heard.  Pulmonary:      Effort: Pulmonary effort is normal.      Breath sounds: No stridor. No rales.   Abdominal:      Palpations: Abdomen is soft.      Tenderness: There is no abdominal tenderness.   Musculoskeletal:         General: Normal range of motion.      Cervical back: Normal range of motion. No rigidity.      Right lower leg: Edema (trace) present.      Left lower leg: Edema (trace) present.   Skin:      General: Skin is warm and dry.   Neurological:      General: No focal deficit present.      Mental Status: She is alert and oriented to person, place, and time.   Psychiatric:         Mood and Affect: Mood normal.         Behavior: Behavior normal.         Fluids  Date 06/04/22 0700 - 06/05/22 0659   Shift 6893-5123 8493-5232 6092-0349 24 Hour Total   INTAKE   P.O. 360   360   I.V. 685.7   685.7   Shift Total 1045.7   1045.7   OUTPUT   Urine 1425   1425   Emesis 0   0   Blood 0   0   Shift Total 1425   1425   Weight (kg) 104.3 104.3 104.3 104.3       Laboratory  Labs reviewed, pertinent labs below.  Recent Labs     06/03/22  2351   WBC 10.6   RBC 3.68*   HEMOGLOBIN 11.1*   HEMATOCRIT 34.0*   MCV 92.4   MCH 30.2   MCHC 32.6*   RDW 50.5*   PLATELETCT 196   MPV 10.0     Recent Labs     06/03/22  2351   SODIUM 143   POTASSIUM 4.0   CHLORIDE 110   CO2 18*   GLUCOSE 117*   BUN 47*   CREATININE 2.81*   CALCIUM 8.4*     Recent Labs     06/03/22  2100   APTT 29.7   INR 1.45*            URINALYSIS:  Lab Results   Component Value Date/Time    COLORURINE Yellow 06/03/2022 2235    CLARITY Clear 06/03/2022 2235    SPECGRAVITY 1.010 06/03/2022 2235    PHURINE 5.0 06/03/2022 2235    KETONES Negative 06/03/2022 2235    PROTEINURIN Negative 06/03/2022 2235    BILIRUBINUR Negative 06/03/2022 2235    UROBILU 0.2 06/03/2022 2235    NITRITE Negative 06/03/2022 2235    LEUKESTERAS Trace (A) 06/03/2022 2235    OCCULTBLOOD Negative 06/03/2022 2235     UPC  No results found for: TOTPROTUR No results found for: CREATININEU    Imaging interpreted by radiologist. Imaging reports reviewed with pertinent findings below  DX-CHEST-PORTABLE (1 VIEW)   Final Result         1. No acute cardiopulmonary abnormalities are identified.      OUTSIDE IMAGES-DX ABDOMEN   Final Result      OUTSIDE IMAGES-MR LUMBAR SPINE   Final Result      OUTSIDE IMAGES-DX CHEST   Final Result      MR-FOREIGN FILM MRI   Final Result      OUTSIDE IMAGES-CT ABDOMEN /PELVIS    Final Result      US-RENAL    (Results Pending)   EC-ECHOCARDIOGRAM COMPLETE W/O CONT    (Results Pending)   US-EXTREMITY VENOUS LOWER BILAT    (Results Pending)   NM-LUNG VENT/PERF IMAGING    (Results Pending)         Assessment/Plan  67 y.o. female with a history of hypertension, asthma who presented 6/3/2022 with LALITHA, sepsis from urinary source.    1.  LALITHA, nonoliguric, already improving.  Creatinine peak 5.9 at outside hospital, improved to 2.8 today.  There is no acute need for dialysis.  Urinalysis is benign.  LALITHA likely from prerenal hypovolemia/sepsis.  Avoid nephrotoxins.  Check labs daily.    2.  History of hypertension, recently with hypotension.  This is likely from dose of outpatient meds being too high, as they were dosed for when the patient was at a heavier weight, and should be adjusted prior to discharge.    3.  Metabolic acidosis, likely due to LALITHA.  Check lactate level.  Continue bicarbonate infusion at 75 to 100 mL/h for now.  Check labs daily.    4.  Hyper uricemia.  There is no acute need for treatment with uric acid lowering agents unless the patient has symptomatic frequent gout or uric acid nephrolithiasis.  Continue to monitor uric acid level.    5.  Hypoalbuminemia, unclear etiology.  Urinalysis shows no proteinuria.  Check urine protein creatinine ratio.    6.  Normocytic anemia, found on admission.  Unclear etiology.  Check CBC daily.    7.  Recent weight loss, unintentional.  Unclear etiology.  Recommend age-appropriate cancer screening.    Discussed with Dr. Avni Meyer MD  Nephrology

## 2022-06-04 NOTE — PROGRESS NOTES
[place] [facility] [place] 17[place].  A&O4, moves all extremities. CHG performed. Skin assessment.  On 2L NC.     Spoke with  about peripheral Levophed. Plan to wean levophed overnight, run peripherally no longer than 12 hours.

## 2022-06-04 NOTE — ASSESSMENT & PLAN NOTE
Her home meds were held given shock state  Restart Propranolol, Benazepril, and isosorbide  She has been requiring IV hydralazine due to SBP over 170

## 2022-06-04 NOTE — ASSESSMENT & PLAN NOTE
LALITHA on CKD III  Cr was 6 prior to transfer  Likely prerenal given hypotension  IV Bicarb drip was given and her Cr has normalized which will be stopped.  No hydronephrosis or nephrolithiasis seen on CTAP at another facility  Renal ultrasound was unremarkable  Nephrology consulted.

## 2022-06-04 NOTE — RESPIRATORY CARE
COPD EDUCATION by COPD CLINICAL EDUCATOR  6/4/2022 at 7:13 AM by Preethi Tate, RRT     Patient reviewed by COPD education team. Patient does not have a history or diagnosis of COPD and is a non-smoker. She follows with [facility] Dr Kern for her HIRAM and Asthma. Last PFT 1/2021 in their office. Utilizes BIPAP at 16/12 with Oxygen. Therefore, patient does not qualify for the COPD program.

## 2022-06-05 ENCOUNTER — APPOINTMENT (OUTPATIENT)
Dept: RADIOLOGY | Facility: MEDICAL CENTER | Age: 67
DRG: 871 | End: 2022-06-05
Attending: STUDENT IN AN ORGANIZED HEALTH CARE EDUCATION/TRAINING PROGRAM
Payer: COMMERCIAL

## 2022-06-05 PROBLEM — I82.90 VTE (VENOUS THROMBOEMBOLISM): Status: RESOLVED | Noted: 2022-06-03 | Resolved: 2022-06-05

## 2022-06-05 LAB
ANION GAP SERPL CALC-SCNC: 10 MMOL/L (ref 7–16)
BASOPHILS # BLD AUTO: 0.6 % (ref 0–1.8)
BASOPHILS # BLD: 0.05 K/UL (ref 0–0.12)
BUN SERPL-MCNC: 21 MG/DL (ref 8–22)
CALCIUM SERPL-MCNC: 8.1 MG/DL (ref 8.5–10.5)
CHLORIDE SERPL-SCNC: 107 MMOL/L (ref 96–112)
CO2 SERPL-SCNC: 28 MMOL/L (ref 20–33)
CREAT SERPL-MCNC: 1.39 MG/DL (ref 0.5–1.4)
EKG IMPRESSION: NORMAL
EOSINOPHIL # BLD AUTO: 0.2 K/UL (ref 0–0.51)
EOSINOPHIL NFR BLD: 2.4 % (ref 0–6.9)
ERYTHROCYTE [DISTWIDTH] IN BLOOD BY AUTOMATED COUNT: 48.2 FL (ref 35.9–50)
GFR SERPLBLD CREATININE-BSD FMLA CKD-EPI: 42 ML/MIN/1.73 M 2
GLUCOSE SERPL-MCNC: 145 MG/DL (ref 65–99)
HCT VFR BLD AUTO: 32 % (ref 37–47)
HGB BLD-MCNC: 10.5 G/DL (ref 12–16)
IMM GRANULOCYTES # BLD AUTO: 0.34 K/UL (ref 0–0.11)
IMM GRANULOCYTES NFR BLD AUTO: 4 % (ref 0–0.9)
LYMPHOCYTES # BLD AUTO: 1.22 K/UL (ref 1–4.8)
LYMPHOCYTES NFR BLD: 14.4 % (ref 22–41)
MCH RBC QN AUTO: 30 PG (ref 27–33)
MCHC RBC AUTO-ENTMCNC: 32.8 G/DL (ref 33.6–35)
MCV RBC AUTO: 91.4 FL (ref 81.4–97.8)
MONOCYTES # BLD AUTO: 0.74 K/UL (ref 0–0.85)
MONOCYTES NFR BLD AUTO: 8.7 % (ref 0–13.4)
NEUTROPHILS # BLD AUTO: 5.94 K/UL (ref 2–7.15)
NEUTROPHILS NFR BLD: 69.9 % (ref 44–72)
NRBC # BLD AUTO: 0 K/UL
NRBC BLD-RTO: 0 /100 WBC
PLATELET # BLD AUTO: 203 K/UL (ref 164–446)
PMV BLD AUTO: 9.6 FL (ref 9–12.9)
POTASSIUM SERPL-SCNC: 3.5 MMOL/L (ref 3.6–5.5)
RBC # BLD AUTO: 3.5 M/UL (ref 4.2–5.4)
SODIUM SERPL-SCNC: 145 MMOL/L (ref 135–145)
UFH PPP CHRO-ACNC: 0.34 IU/ML
WBC # BLD AUTO: 8.5 K/UL (ref 4.8–10.8)

## 2022-06-05 PROCEDURE — 770001 HCHG ROOM/CARE - MED/SURG/GYN PRIV*

## 2022-06-05 PROCEDURE — A9270 NON-COVERED ITEM OR SERVICE: HCPCS | Performed by: HOSPITALIST

## 2022-06-05 PROCEDURE — 700102 HCHG RX REV CODE 250 W/ 637 OVERRIDE(OP): Performed by: HOSPITALIST

## 2022-06-05 PROCEDURE — 99233 SBSQ HOSP IP/OBS HIGH 50: CPT | Performed by: HOSPITALIST

## 2022-06-05 PROCEDURE — 85025 COMPLETE CBC W/AUTO DIFF WBC: CPT

## 2022-06-05 PROCEDURE — 700102 HCHG RX REV CODE 250 W/ 637 OVERRIDE(OP): Performed by: STUDENT IN AN ORGANIZED HEALTH CARE EDUCATION/TRAINING PROGRAM

## 2022-06-05 PROCEDURE — 94760 N-INVAS EAR/PLS OXIMETRY 1: CPT

## 2022-06-05 PROCEDURE — A9270 NON-COVERED ITEM OR SERVICE: HCPCS | Performed by: STUDENT IN AN ORGANIZED HEALTH CARE EDUCATION/TRAINING PROGRAM

## 2022-06-05 PROCEDURE — 93010 ELECTROCARDIOGRAM REPORT: CPT | Performed by: INTERNAL MEDICINE

## 2022-06-05 PROCEDURE — 700111 HCHG RX REV CODE 636 W/ 250 OVERRIDE (IP): Performed by: STUDENT IN AN ORGANIZED HEALTH CARE EDUCATION/TRAINING PROGRAM

## 2022-06-05 PROCEDURE — 93970 EXTREMITY STUDY: CPT

## 2022-06-05 PROCEDURE — 94660 CPAP INITIATION&MGMT: CPT

## 2022-06-05 PROCEDURE — 85520 HEPARIN ASSAY: CPT

## 2022-06-05 PROCEDURE — 99232 SBSQ HOSP IP/OBS MODERATE 35: CPT | Performed by: INTERNAL MEDICINE

## 2022-06-05 PROCEDURE — 700105 HCHG RX REV CODE 258: Performed by: STUDENT IN AN ORGANIZED HEALTH CARE EDUCATION/TRAINING PROGRAM

## 2022-06-05 PROCEDURE — 80048 BASIC METABOLIC PNL TOTAL CA: CPT

## 2022-06-05 RX ADMIN — SODIUM BICARBONATE: 84 INJECTION, SOLUTION INTRAVENOUS at 02:08

## 2022-06-05 RX ADMIN — LEVOTHYROXINE SODIUM 100 MCG: 0.1 TABLET ORAL at 05:41

## 2022-06-05 RX ADMIN — ONDANSETRON 4 MG: 2 INJECTION INTRAMUSCULAR; INTRAVENOUS at 06:00

## 2022-06-05 RX ADMIN — ACETAMINOPHEN 650 MG: 325 TABLET ORAL at 02:08

## 2022-06-05 RX ADMIN — RIVAROXABAN 10 MG: 10 TABLET, FILM COATED ORAL at 17:01

## 2022-06-05 RX ADMIN — ASPIRIN 81 MG: 81 TABLET, COATED ORAL at 05:41

## 2022-06-05 RX ADMIN — BUDESONIDE AND FORMOTEROL FUMARATE DIHYDRATE 2 PUFF: 160; 4.5 AEROSOL RESPIRATORY (INHALATION) at 16:56

## 2022-06-05 RX ADMIN — HYDRALAZINE HYDROCHLORIDE 10 MG: 20 INJECTION INTRAMUSCULAR; INTRAVENOUS at 16:11

## 2022-06-05 RX ADMIN — ONDANSETRON 4 MG: 2 INJECTION INTRAMUSCULAR; INTRAVENOUS at 19:35

## 2022-06-05 RX ADMIN — SEVELAMER CARBONATE 800 MG: 800 TABLET, FILM COATED ORAL at 06:53

## 2022-06-05 RX ADMIN — OMEPRAZOLE 20 MG: 20 CAPSULE, DELAYED RELEASE ORAL at 16:55

## 2022-06-05 RX ADMIN — OXYCODONE HYDROCHLORIDE 10 MG: 10 TABLET ORAL at 16:55

## 2022-06-05 RX ADMIN — CEFTRIAXONE SODIUM 2 G: 10 INJECTION, POWDER, FOR SOLUTION INTRAVENOUS at 05:40

## 2022-06-05 RX ADMIN — ATORVASTATIN CALCIUM 40 MG: 40 TABLET, FILM COATED ORAL at 16:55

## 2022-06-05 RX ADMIN — OMEPRAZOLE 20 MG: 20 CAPSULE, DELAYED RELEASE ORAL at 05:41

## 2022-06-05 RX ADMIN — SERTRALINE 100 MG: 100 TABLET, FILM COATED ORAL at 05:40

## 2022-06-05 RX ADMIN — ONDANSETRON 4 MG: 2 INJECTION INTRAMUSCULAR; INTRAVENOUS at 13:49

## 2022-06-05 RX ADMIN — OXYCODONE HYDROCHLORIDE 10 MG: 10 TABLET ORAL at 05:56

## 2022-06-05 ASSESSMENT — ENCOUNTER SYMPTOMS
ABDOMINAL PAIN: 0
WEIGHT LOSS: 1
COUGH: 1
FEVER: 0
SHORTNESS OF BREATH: 0

## 2022-06-05 ASSESSMENT — PAIN DESCRIPTION - PAIN TYPE
TYPE: ACUTE PAIN

## 2022-06-05 NOTE — CARE PLAN
The patient is Stable - Low risk of patient condition declining or worsening    Shift Goals  Clinical Goals: hemodynamic stability, mobility, maintain/improve skin integrity, nuclear medicine test, manage nausea, anticoagulation  Patient Goals: improvement, rest, social work  Family Goals: unable to assess    Progress made toward(s) clinical / shift goals:    Problem: Knowledge Deficit - Standard  Goal: Patient and family/care givers will demonstrate understanding of plan of care, disease process/condition, diagnostic tests and medications  Outcome: Progressing     Problem: Hemodynamics  Goal: Patient's hemodynamics, fluid balance and neurologic status will be stable or improve  Outcome: Progressing     Problem: Urinary - Renal Perfusion  Goal: Ability to achieve and maintain adequate renal perfusion and functioning will improve  Outcome: Progressing     Problem: Fall Risk  Goal: Patient will remain free from falls  Outcome: Progressing     Problem: Pain - Standard  Goal: Alleviation of pain or a reduction in pain to the patient’s comfort goal  Outcome: Progressing

## 2022-06-05 NOTE — PROGRESS NOTES
Requested by bedside RN to come address code status.    On initial discussion with admitting hospitalist patient elected to be partial code, okay with CPR but did not want to have any intubation.  RN was concerned with this CODE STATUS combination and was requesting I have a further discussion with the patient.  I was able to have a long discussion and explained the different aspects of CODE STATUS and what resuscitation entails.  After further discussion and answering her questions and concerns she has elected to be DNR/DNI, she is okay if she requires defibrillation but does not want to be placed on a ventilator under any circumstances, does not want CPR alone.  CODE STATUS updated DNR/DNI okay with defibrillation if needed per patient wishes.
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
## 2022-06-05 NOTE — PROGRESS NOTES
Aaox4, denies any discomfort, perez to dd with adeq UO, heparin dcd as ordered, with orders for xarelto, concerns regarding affordability, will advise CM/SW, POC discussed, encouarged use of IS, needs attended.

## 2022-06-05 NOTE — CARE PLAN
"  Problem: Knowledge Deficit - Standard  Goal: Patient and family/care givers will demonstrate understanding of plan of care, disease process/condition, diagnostic tests and medications  Outcome: Progressing     Problem: Hemodynamics  Goal: Patient's hemodynamics, fluid balance and neurologic status will be stable or improve  Outcome: Progressing     Problem: Fluid Volume  Goal: Fluid volume balance will be maintained  Outcome: Progressing     Problem: Urinary - Renal Perfusion  Goal: Ability to achieve and maintain adequate renal perfusion and functioning will improve  Outcome: Progressing     Problem: Respiratory  Goal: Patient will achieve/maintain optimum respiratory ventilation and gas exchange  Outcome: Progressing     Problem: Fall Risk  Goal: Patient will remain free from falls  Outcome: Progressing     Problem: Pain - Standard  Goal: Alleviation of pain or a reduction in pain to the patient’s comfort goal  Outcome: Progressing     The patient is Watcher - Medium risk of patient condition declining or worsening    Shift Goals  Clinical Goals: Stable hemodynamics, MAP >65  Patient Goals: \"sleep and brush my teeth\"  Family Goals: not present    Progress made toward(s) clinical / shift goals: map >65 without use of vasoactive medications, stable hemodynamics. No acute issues overnight, safety and comfort maintained throughout shift.    Patient is not progressing towards the following goals:      "

## 2022-06-05 NOTE — PROGRESS NOTES
Called [facility] regarding urine culture and blood cultures result as requested by Dr. Holly, will fax per lab personnel.

## 2022-06-05 NOTE — PROGRESS NOTES
Nephrology Daily Progress Note    Date of Service  6/5/2022    Chief Complaint  67 y.o. female with a history of hypertension, asthma who presented 6/3/2022 with LALITHA, sepsis from urinary source.    Interval Problem Update  6/5 -urine output 3.3 L in the last 24 hours.  Patient denies chest pain, shortness of breath.    Review of Systems  Review of Systems   Constitutional: Negative for fever.   Respiratory: Negative for shortness of breath.    Cardiovascular: Negative for chest pain.   Gastrointestinal: Negative for abdominal pain.   All other systems reviewed and are negative.       Physical Exam  Temp:  [36.1 °C (97 °F)-36.4 °C (97.5 °F)] 36.1 °C (97 °F)  Pulse:  [73-96] 84  Resp:  [22] 22  BP: (115-164)/(54-77) 147/65  SpO2:  [90 %-100 %] 95 %    Physical Exam  Constitutional:       General: She is not in acute distress.     Appearance: She is well-developed. She is obese.   HENT:      Head: Normocephalic and atraumatic.      Mouth/Throat:      Mouth: Mucous membranes are moist.      Pharynx: Oropharynx is clear. No oropharyngeal exudate.   Eyes:      General: No scleral icterus.     Extraocular Movements: Extraocular movements intact.   Neck:      Trachea: No tracheal deviation.   Cardiovascular:      Rate and Rhythm: Normal rate and regular rhythm.      Heart sounds: Normal heart sounds. No murmur heard.  Pulmonary:      Effort: Pulmonary effort is normal.      Breath sounds: No stridor. No rales.   Abdominal:      Palpations: Abdomen is soft.      Tenderness: There is no abdominal tenderness.   Musculoskeletal:         General: Normal range of motion.      Cervical back: Normal range of motion. No rigidity.      Right lower leg: Edema (trace) present.      Left lower leg: Edema (trace) present.   Skin:     General: Skin is warm and dry.   Neurological:      General: No focal deficit present.      Mental Status: She is alert and oriented to person, place, and time.   Psychiatric:         Mood and Affect: Mood  normal.         Behavior: Behavior normal.         Fluids    Intake/Output Summary (Last 24 hours) at 6/5/2022 1427  Last data filed at 6/5/2022 1100  Gross per 24 hour   Intake 1111.96 ml   Output 2725 ml   Net -1613.04 ml       Laboratory  Labs reviewed, pertinent labs below.  Recent Labs     06/03/22 2351 06/05/22  0550   WBC 10.6 8.5   RBC 3.68* 3.50*   HEMOGLOBIN 11.1* 10.5*   HEMATOCRIT 34.0* 32.0*   MCV 92.4 91.4   MCH 30.2 30.0   MCHC 32.6* 32.8*   RDW 50.5* 48.2   PLATELETCT 196 203   MPV 10.0 9.6     Recent Labs     06/03/22  2351 06/05/22  0550   SODIUM 143 145   POTASSIUM 4.0 3.5*   CHLORIDE 110 107   CO2 18* 28   GLUCOSE 117* 145*   BUN 47* 21   CREATININE 2.81* 1.39   CALCIUM 8.4* 8.1*     Recent Labs     06/03/22  2100   APTT 29.7   INR 1.45*           URINALYSIS:  Lab Results   Component Value Date/Time    COLORURINE Yellow 06/03/2022 2235    CLARITY Clear 06/03/2022 2235    SPECGRAVITY 1.010 06/03/2022 2235    PHURINE 5.0 06/03/2022 2235    KETONES Negative 06/03/2022 2235    PROTEINURIN Negative 06/03/2022 2235    BILIRUBINUR Negative 06/03/2022 2235    UROBILU 0.2 06/03/2022 2235    NITRITE Negative 06/03/2022 2235    LEUKESTERAS Trace (A) 06/03/2022 2235    OCCULTBLOOD Negative 06/03/2022 2235     UPC  Lab Results   Component Value Date/Time    TOTPROTUR 35.0 (H) 06/04/2022 1508    TOTPROTUR 34.0 (H) 06/04/2022 1508      Lab Results   Component Value Date/Time    CREATININEU 72.87 06/04/2022 1508    CREATININEU 75.77 06/04/2022 1508         Imaging interpreted by radiologist. Imaging reports reviewed with pertinent findings below  US-RENAL   Final Result      1.  No hydronephrosis.   2.  Benign left renal cyst, which does not require imaging follow-up.      NM-LUNG VENT/PERF IMAGING   Final Result      1.  Low probability VQ scan.      DX-CHEST-PORTABLE (1 VIEW)   Final Result         1. No acute cardiopulmonary abnormalities are identified.      OUTSIDE IMAGES-DX ABDOMEN   Final Result       OUTSIDE IMAGES-MR LUMBAR SPINE   Final Result      OUTSIDE IMAGES-DX CHEST   Final Result      MR-FOREIGN FILM MRI   Final Result      OUTSIDE IMAGES-CT ABDOMEN /PELVIS   Final Result      EC-ECHOCARDIOGRAM COMPLETE W/O CONT    (Results Pending)   US-EXTREMITY VENOUS LOWER BILAT    (Results Pending)         Current Facility-Administered Medications   Medication Dose Route Frequency Provider Last Rate Last Admin   • rivaroxaban (XARELTO) tablet 10 mg  10 mg Oral DAILY AT 1800 Johnnie Holly M.D.       • senna-docusate (PERICOLACE or SENOKOT S) 8.6-50 MG per tablet 2 Tablet  2 Tablet Oral BID Beto Cardoza M.D.        And   • polyethylene glycol/lytes (MIRALAX) PACKET 1 Packet  1 Packet Oral QDAY PRN Beto Cardoza M.D.        And   • magnesium hydroxide (MILK OF MAGNESIA) suspension 30 mL  30 mL Oral QDAY PRN Beto Cardoza M.D.        And   • bisacodyl (DULCOLAX) suppository 10 mg  10 mg Rectal QDAY PRN Beto Cardoza M.D.       • acetaminophen (Tylenol) tablet 650 mg  650 mg Oral Q6HRS PRN Beto Cardoza M.D.   650 mg at 06/05/22 0208   • hydrALAZINE (APRESOLINE) injection 10 mg  10 mg Intravenous Q4HRS PRN Beto Cardoza M.D.       • ondansetron (ZOFRAN) syringe/vial injection 4 mg  4 mg Intravenous Q4HRS PRN Beto Cardoza M.D.   4 mg at 06/05/22 1349   • ondansetron (ZOFRAN ODT) dispertab 4 mg  4 mg Oral Q4HRS PRN Beto Cardoza M.D.       • guaiFENesin dextromethorphan (ROBITUSSIN DM) 100-10 MG/5ML syrup 10 mL  10 mL Oral Q6HRS PRN Beto Cardoza M.D.       • Pharmacy Consult Request - to monitor for nephrotoxic agents  1 Each Other PHARMACY TO DOSE Beto Cardoza M.D.       • cefTRIAXone (Rocephin) syringe 2 g  2 g Intravenous Q24HRS Johnnie Holly M.D.   2 g at 06/05/22 0540   • omeprazole (PRILOSEC) capsule 20 mg  20 mg Oral BID Beto Cardoza M.D.   20 mg at 06/05/22 0541   • aspirin EC (ECOTRIN) tablet 81 mg  81 mg Oral DAILY Beto Cardoza M.D.   81 mg at 06/05/22 0541   • levothyroxine (SYNTHROID) tablet 100 mcg   100 mcg Oral DAILY Beto Cardoza M.D.   100 mcg at 06/05/22 0541   • sertraline (Zoloft) tablet 100 mg  100 mg Oral DAILY Beto Cardoza M.D.   100 mg at 06/05/22 0540   • atorvastatin (LIPITOR) tablet 40 mg  40 mg Oral Q EVENING Beto Cardoza M.D.   40 mg at 06/04/22 1721   • budesonide-formoterol (SYMBICORT) 160-4.5 MCG/ACT inhaler 2 Puff  2 Puff Inhalation BID Beto Cardoza M.D.   2 Puff at 06/04/22 1800   • oxyCODONE immediate release (ROXICODONE) tablet 10 mg  10 mg Oral Q6HRS PRN Sylvain Andrews M.D.   10 mg at 06/05/22 0556         Assessment/Plan  67 y.o. female with a history of hypertension, asthma who presented 6/3/2022 with LALITHA, sepsis from urinary source.     1.  LALITHA, nonoliguric, improving.  Creatinine peak 5.9 at outside hospital, improved to 1.4 today.  No need for dialysis.  Urinalysis benign, urine with minimal proteinuria.  LALITHA likely from prerenal hypovolemia/sepsis.  Avoid nephrotoxins.  Check labs daily.     2.  History of hypertension, admitted with hypotension.  Patient becoming more hypertensive.  Okay to resume home benazepril 40 mg p.o. daily.     3.  Metabolic acidosis, improved.  No further need for bicarbonate supplements, discontinue bicarbonate infusion.  Check labs daily.     4.  Hyperuricemia.  Noted on admission.  No acute need for uric acid lowering therapy unless the patient has frequent gout attacks or uric acid kidney stones.  Continue to monitor uric acid level as an outpatient.     5.  Hypoalbuminemia, noted on labs yesterday.  Likely due to poor oral intake.  Urinalysis with minimal proteinuria.      6.  Normocytic anemia, slightly worsening.  Unclear etiology.  Check CBC daily.     7.  Recent weight loss, unintentional.    Unclear etiology.  Recommend age-appropriate cancer screening.   With anemia and recent LALITHA, check SPEP/UPEP.    As the patient is nonoliguric and kidney function is improving, nephrology will sign off.  Please call back with further questions or  concerns.      Billy Meyer MD  Nephrology

## 2022-06-05 NOTE — ASSESSMENT & PLAN NOTE
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  Elevated D-dimer at another facility  IV heparin drip stopped as VQ scan revealed low probability of PE and negative LE duplex

## 2022-06-05 NOTE — CARE PLAN
"  Problem: Knowledge Deficit - Standard  Goal: Patient and family/care givers will demonstrate understanding of plan of care, disease process/condition, diagnostic tests and medications  Note: Heparinn dcd, xarelto ordered     Problem: Respiratory  Goal: Patient will achieve/maintain optimum respiratory ventilation and gas exchange  Note: Encouraged use of IS, titrate O2 as needed   The patient is Watcher - Medium risk of patient condition declining or worsening    Shift Goals  Clinical Goals: Stable hemodynamics, MAP >65  Patient Goals: \"sleep and brush my teeth\"  Family Goals: not present    Progress made toward(s) clinical / shift goals:  monitor kidney function, titrate O2    Patient is not progressing towards the following goals:      "
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
## 2022-06-06 ENCOUNTER — APPOINTMENT (OUTPATIENT)
Dept: CARDIOLOGY | Facility: MEDICAL CENTER | Age: 67
DRG: 871 | End: 2022-06-06
Attending: STUDENT IN AN ORGANIZED HEALTH CARE EDUCATION/TRAINING PROGRAM
Payer: COMMERCIAL

## 2022-06-06 PROBLEM — N17.9 AKI (ACUTE KIDNEY INJURY) (HCC): Status: ACTIVE | Noted: 2022-06-06

## 2022-06-06 LAB
LV EJECT FRACT  99904: 65
LV EJECT FRACT MOD 2C 99903: 60.88
LV EJECT FRACT MOD 4C 99902: 71.56
LV EJECT FRACT MOD BP 99901: 66.63

## 2022-06-06 PROCEDURE — 770020 HCHG ROOM/CARE - TELE (206)

## 2022-06-06 PROCEDURE — 99232 SBSQ HOSP IP/OBS MODERATE 35: CPT | Performed by: HOSPITALIST

## 2022-06-06 PROCEDURE — 700102 HCHG RX REV CODE 250 W/ 637 OVERRIDE(OP): Performed by: HOSPITALIST

## 2022-06-06 PROCEDURE — 700111 HCHG RX REV CODE 636 W/ 250 OVERRIDE (IP): Performed by: HOSPITALIST

## 2022-06-06 PROCEDURE — A9270 NON-COVERED ITEM OR SERVICE: HCPCS | Performed by: HOSPITALIST

## 2022-06-06 PROCEDURE — 770001 HCHG ROOM/CARE - MED/SURG/GYN PRIV*

## 2022-06-06 PROCEDURE — 93306 TTE W/DOPPLER COMPLETE: CPT

## 2022-06-06 PROCEDURE — 700102 HCHG RX REV CODE 250 W/ 637 OVERRIDE(OP): Performed by: STUDENT IN AN ORGANIZED HEALTH CARE EDUCATION/TRAINING PROGRAM

## 2022-06-06 PROCEDURE — 93306 TTE W/DOPPLER COMPLETE: CPT | Mod: 26 | Performed by: INTERNAL MEDICINE

## 2022-06-06 PROCEDURE — 700111 HCHG RX REV CODE 636 W/ 250 OVERRIDE (IP): Performed by: STUDENT IN AN ORGANIZED HEALTH CARE EDUCATION/TRAINING PROGRAM

## 2022-06-06 PROCEDURE — A9270 NON-COVERED ITEM OR SERVICE: HCPCS | Performed by: STUDENT IN AN ORGANIZED HEALTH CARE EDUCATION/TRAINING PROGRAM

## 2022-06-06 RX ORDER — BUPROPION HYDROCHLORIDE 200 MG/1
200 TABLET, EXTENDED RELEASE ORAL 2 TIMES DAILY
COMMUNITY
End: 2022-10-20

## 2022-06-06 RX ORDER — TOPIRAMATE 50 MG/1
50 TABLET, FILM COATED ORAL DAILY
COMMUNITY
End: 2022-10-20

## 2022-06-06 RX ORDER — ARIPIPRAZOLE 10 MG/1
10 TABLET ORAL DAILY
Status: DISCONTINUED | OUTPATIENT
Start: 2022-06-06 | End: 2022-06-06

## 2022-06-06 RX ORDER — ISOSORBIDE MONONITRATE 30 MG/1
30 TABLET, EXTENDED RELEASE ORAL DAILY
Status: DISCONTINUED | OUTPATIENT
Start: 2022-06-06 | End: 2022-06-07

## 2022-06-06 RX ORDER — QUETIAPINE FUMARATE 25 MG/1
25-50 TABLET, FILM COATED ORAL 2 TIMES DAILY
COMMUNITY
End: 2022-10-20

## 2022-06-06 RX ORDER — PROPRANOLOL HCL 60 MG
60 CAPSULE, EXTENDED RELEASE 24HR ORAL DAILY
Status: DISCONTINUED | OUTPATIENT
Start: 2022-06-06 | End: 2022-06-08 | Stop reason: HOSPADM

## 2022-06-06 RX ORDER — CYCLOBENZAPRINE HCL 10 MG
10 TABLET ORAL 3 TIMES DAILY PRN
COMMUNITY
End: 2022-10-20

## 2022-06-06 RX ORDER — BENAZEPRIL HYDROCHLORIDE 20 MG/1
40 TABLET ORAL DAILY
Status: DISCONTINUED | OUTPATIENT
Start: 2022-06-06 | End: 2022-06-08 | Stop reason: HOSPADM

## 2022-06-06 RX ADMIN — OXYCODONE HYDROCHLORIDE 10 MG: 10 TABLET ORAL at 00:27

## 2022-06-06 RX ADMIN — CEFTRIAXONE SODIUM 2 G: 10 INJECTION, POWDER, FOR SOLUTION INTRAVENOUS at 05:34

## 2022-06-06 RX ADMIN — ISOSORBIDE MONONITRATE 30 MG: 30 TABLET, EXTENDED RELEASE ORAL at 10:55

## 2022-06-06 RX ADMIN — ACETAMINOPHEN 650 MG: 325 TABLET ORAL at 17:42

## 2022-06-06 RX ADMIN — HYDRALAZINE HYDROCHLORIDE 10 MG: 20 INJECTION INTRAMUSCULAR; INTRAVENOUS at 20:11

## 2022-06-06 RX ADMIN — ASPIRIN 81 MG: 81 TABLET, COATED ORAL at 05:33

## 2022-06-06 RX ADMIN — LEVOTHYROXINE SODIUM 100 MCG: 0.1 TABLET ORAL at 05:33

## 2022-06-06 RX ADMIN — BUDESONIDE AND FORMOTEROL FUMARATE DIHYDRATE 2 PUFF: 160; 4.5 AEROSOL RESPIRATORY (INHALATION) at 16:28

## 2022-06-06 RX ADMIN — OXYCODONE HYDROCHLORIDE 10 MG: 10 TABLET ORAL at 19:40

## 2022-06-06 RX ADMIN — ATORVASTATIN CALCIUM 40 MG: 40 TABLET, FILM COATED ORAL at 16:27

## 2022-06-06 RX ADMIN — OMEPRAZOLE 20 MG: 20 CAPSULE, DELAYED RELEASE ORAL at 05:34

## 2022-06-06 RX ADMIN — OMEPRAZOLE 20 MG: 20 CAPSULE, DELAYED RELEASE ORAL at 16:27

## 2022-06-06 RX ADMIN — BENAZEPRIL HYDROCHLORIDE 40 MG: 20 TABLET ORAL at 10:55

## 2022-06-06 RX ADMIN — HYDRALAZINE HYDROCHLORIDE 10 MG: 20 INJECTION INTRAMUSCULAR; INTRAVENOUS at 08:04

## 2022-06-06 RX ADMIN — RIVAROXABAN 10 MG: 10 TABLET, FILM COATED ORAL at 16:27

## 2022-06-06 RX ADMIN — BUDESONIDE AND FORMOTEROL FUMARATE DIHYDRATE 2 PUFF: 160; 4.5 AEROSOL RESPIRATORY (INHALATION) at 05:34

## 2022-06-06 RX ADMIN — SERTRALINE 100 MG: 100 TABLET, FILM COATED ORAL at 05:33

## 2022-06-06 RX ADMIN — ONDANSETRON 4 MG: 2 INJECTION INTRAMUSCULAR; INTRAVENOUS at 10:55

## 2022-06-06 RX ADMIN — PROPRANOLOL HYDROCHLORIDE 60 MG: 60 CAPSULE, EXTENDED RELEASE ORAL at 10:56

## 2022-06-06 ASSESSMENT — ENCOUNTER SYMPTOMS
SHORTNESS OF BREATH: 0
TREMORS: 1
COUGH: 0
WEIGHT LOSS: 1

## 2022-06-06 ASSESSMENT — PAIN DESCRIPTION - PAIN TYPE
TYPE: ACUTE PAIN
TYPE: ACUTE PAIN;CHRONIC PAIN
TYPE: ACUTE PAIN

## 2022-06-06 ASSESSMENT — FIBROSIS 4 INDEX
FIB4 SCORE: 1.53
FIB4 SCORE: 1.53

## 2022-06-06 NOTE — PROGRESS NOTES
Med rec completed per pt's home pharmacy ([facility])  Unable to verify OTC medications   Unable to verify pt's current Warfarin regiment
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
## 2022-06-06 NOTE — CARE PLAN
The patient is Stable - Low risk of patient condition declining or worsening    Shift Goals  Clinical Goals: SPO2% will remain above 90% during shift  Patient Goals: sleep comfortably  Family Goals: not present    Progress made toward(s) clinical / shift goals:  SPO2% is 93% at this time      Problem: Knowledge Deficit - Standard  Goal: Patient and family/care givers will demonstrate understanding of plan of care, disease process/condition, diagnostic tests and medications  Outcome: Progressing  Note: Pt educated regarding plan of care and medications. All questions answered.       Problem: Hemodynamics  Goal: Patient's hemodynamics, fluid balance and neurologic status will be stable or improve  Outcome: Progressing  Note: BP stabilized, VS taken Q4 hours, pt on continuous cardiac monitoring. Daily labs drawn.

## 2022-06-06 NOTE — PROGRESS NOTES
Spiritual Care Note    Patient Information     Patient's Name: Johana Marx   MRN: [id number]    YOB: 1955   Age and Gender: 67 y.o. female   Service Area:    Room (and Bed): [place] [address]   Ethnicity or Nationality: White    Primary Language: English   Catholic/Spiritual preference: Mormon   Place of Residence: [place]   Family/Friends/Others Present:    Clinical Team Present:    Medical Diagnosis(-es)/Procedure(s):    Code Status: Full Code    Date of Admission: 6/3/2022   Length of Stay: 3 days        Spiritual Care Provider Information:  Name of Spiritual Care Provider: Song Rodarte  Title of Spiritual Care Provider: Volunteer   Phone Number: [phone]  E-mail: [email or web address]  Total time : 20 minutes    Spiritual Screen Results:    Gen Nursing  Spiritual Screen  Was spiritual care education provided to the patient?: Yes     Palliative Care  PC Catholic/Spiritual Screening  Was spiritual care education provided to the patient?: Yes    Encounter/Request Information  Encounter/Request Type   Visited With: Patient  Nature of the Visit: Initial, On shift  General Visit: Yes  Referral From/ Origin of Request: Epic nursing    Catholic Needs/Values  Catholic Needs Visit  Catholic Needs: Literature/Devotional material, Prayer    Spiritual Assessment   Spiritual Care Encounters  Observations/Symptoms: Accepting, Thankfulness  Interaction/Conversation: Pt requested a Bible,  provided one and a prayer for pt's healing.  Assessment: Need  Need: Seeking Spiritual Assistance and Support  Intended Effects: Sarah Affirmation, Promote a Sense of Peace  Methods: Active listening, reflection, empathetic support  Interventions: Prayer  Outcomes: Connectedness with the Holy/with God, Spiritual Comfort  Plan: Visit Upon Request    Notes:
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
## 2022-06-06 NOTE — DISCHARGE PLANNING
Received Choice form at 1549  Agency/Facility Name: 1)[facility],3)[facility]  Referral sent per Choice form at 1610

## 2022-06-07 LAB
ANION GAP SERPL CALC-SCNC: 10 MMOL/L (ref 7–16)
BUN SERPL-MCNC: 11 MG/DL (ref 8–22)
CALCIUM SERPL-MCNC: 8.4 MG/DL (ref 8.5–10.5)
CHLORIDE SERPL-SCNC: 109 MMOL/L (ref 96–112)
CO2 SERPL-SCNC: 25 MMOL/L (ref 20–33)
CREAT SERPL-MCNC: 1.04 MG/DL (ref 0.5–1.4)
GFR SERPLBLD CREATININE-BSD FMLA CKD-EPI: 59 ML/MIN/1.73 M 2
GLUCOSE SERPL-MCNC: 95 MG/DL (ref 65–99)
POTASSIUM SERPL-SCNC: 4 MMOL/L (ref 3.6–5.5)
SODIUM SERPL-SCNC: 144 MMOL/L (ref 135–145)

## 2022-06-07 PROCEDURE — A9270 NON-COVERED ITEM OR SERVICE: HCPCS | Performed by: HOSPITALIST

## 2022-06-07 PROCEDURE — 700102 HCHG RX REV CODE 250 W/ 637 OVERRIDE(OP): Performed by: HOSPITALIST

## 2022-06-07 PROCEDURE — 700102 HCHG RX REV CODE 250 W/ 637 OVERRIDE(OP): Performed by: STUDENT IN AN ORGANIZED HEALTH CARE EDUCATION/TRAINING PROGRAM

## 2022-06-07 PROCEDURE — 99232 SBSQ HOSP IP/OBS MODERATE 35: CPT | Performed by: STUDENT IN AN ORGANIZED HEALTH CARE EDUCATION/TRAINING PROGRAM

## 2022-06-07 PROCEDURE — 770020 HCHG ROOM/CARE - TELE (206)

## 2022-06-07 PROCEDURE — 94760 N-INVAS EAR/PLS OXIMETRY 1: CPT

## 2022-06-07 PROCEDURE — 80048 BASIC METABOLIC PNL TOTAL CA: CPT

## 2022-06-07 PROCEDURE — A9270 NON-COVERED ITEM OR SERVICE: HCPCS | Performed by: STUDENT IN AN ORGANIZED HEALTH CARE EDUCATION/TRAINING PROGRAM

## 2022-06-07 PROCEDURE — 97165 OT EVAL LOW COMPLEX 30 MIN: CPT

## 2022-06-07 RX ORDER — ISOSORBIDE MONONITRATE 30 MG/1
60 TABLET, EXTENDED RELEASE ORAL DAILY
Status: DISCONTINUED | OUTPATIENT
Start: 2022-06-08 | End: 2022-06-08

## 2022-06-07 RX ADMIN — PROPRANOLOL HYDROCHLORIDE 60 MG: 60 CAPSULE, EXTENDED RELEASE ORAL at 05:23

## 2022-06-07 RX ADMIN — ISOSORBIDE MONONITRATE 30 MG: 30 TABLET, EXTENDED RELEASE ORAL at 05:19

## 2022-06-07 RX ADMIN — OMEPRAZOLE 20 MG: 20 CAPSULE, DELAYED RELEASE ORAL at 17:44

## 2022-06-07 RX ADMIN — ASPIRIN 81 MG: 81 TABLET, COATED ORAL at 05:18

## 2022-06-07 RX ADMIN — SENNOSIDES AND DOCUSATE SODIUM 2 TABLET: 50; 8.6 TABLET ORAL at 17:44

## 2022-06-07 RX ADMIN — SERTRALINE 100 MG: 100 TABLET, FILM COATED ORAL at 05:18

## 2022-06-07 RX ADMIN — BENAZEPRIL HYDROCHLORIDE 40 MG: 20 TABLET ORAL at 05:18

## 2022-06-07 RX ADMIN — BUDESONIDE AND FORMOTEROL FUMARATE DIHYDRATE 2 PUFF: 160; 4.5 AEROSOL RESPIRATORY (INHALATION) at 18:21

## 2022-06-07 RX ADMIN — LEVOTHYROXINE SODIUM 100 MCG: 0.1 TABLET ORAL at 05:18

## 2022-06-07 RX ADMIN — OXYCODONE HYDROCHLORIDE 10 MG: 10 TABLET ORAL at 10:21

## 2022-06-07 RX ADMIN — BUDESONIDE AND FORMOTEROL FUMARATE DIHYDRATE 2 PUFF: 160; 4.5 AEROSOL RESPIRATORY (INHALATION) at 05:25

## 2022-06-07 RX ADMIN — ATORVASTATIN CALCIUM 40 MG: 40 TABLET, FILM COATED ORAL at 17:44

## 2022-06-07 RX ADMIN — RIVAROXABAN 10 MG: 10 TABLET, FILM COATED ORAL at 17:44

## 2022-06-07 RX ADMIN — OMEPRAZOLE 20 MG: 20 CAPSULE, DELAYED RELEASE ORAL at 05:18

## 2022-06-07 ASSESSMENT — COGNITIVE AND FUNCTIONAL STATUS - GENERAL
MOVING TO AND FROM BED TO CHAIR: A LITTLE
HELP NEEDED FOR BATHING: A LOT
HELP NEEDED FOR BATHING: A LITTLE
TURNING FROM BACK TO SIDE WHILE IN FLAT BAD: A LITTLE
MOVING FROM LYING ON BACK TO SITTING ON SIDE OF FLAT BED: A LOT
SUGGESTED CMS G CODE MODIFIER DAILY ACTIVITY: CK
DRESSING REGULAR UPPER BODY CLOTHING: A LITTLE
CLIMB 3 TO 5 STEPS WITH RAILING: A LOT
WALKING IN HOSPITAL ROOM: A LOT
DRESSING REGULAR LOWER BODY CLOTHING: A LOT
DAILY ACTIVITIY SCORE: 17
MOBILITY SCORE: 14
SUGGESTED CMS G CODE MODIFIER MOBILITY: CL
STANDING UP FROM CHAIR USING ARMS: A LOT
DAILY ACTIVITIY SCORE: 22
TOILETING: A LOT
SUGGESTED CMS G CODE MODIFIER DAILY ACTIVITY: CJ
DRESSING REGULAR LOWER BODY CLOTHING: A LITTLE

## 2022-06-07 ASSESSMENT — PATIENT HEALTH QUESTIONNAIRE - PHQ9
7. TROUBLE CONCENTRATING ON THINGS, SUCH AS READING THE NEWSPAPER OR WATCHING TELEVISION: NOT AT ALL
1. LITTLE INTEREST OR PLEASURE IN DOING THINGS: SEVERAL DAYS
8. MOVING OR SPEAKING SO SLOWLY THAT OTHER PEOPLE COULD HAVE NOTICED. OR THE OPPOSITE, BEING SO FIGETY OR RESTLESS THAT YOU HAVE BEEN MOVING AROUND A LOT MORE THAN USUAL: NOT AT ALL
2. FEELING DOWN, DEPRESSED, IRRITABLE, OR HOPELESS: SEVERAL DAYS
SUM OF ALL RESPONSES TO PHQ QUESTIONS 1-9: 4
6. FEELING BAD ABOUT YOURSELF - OR THAT YOU ARE A FAILURE OR HAVE LET YOURSELF OR YOUR FAMILY DOWN: SEVERAL DAYS
3. TROUBLE FALLING OR STAYING ASLEEP OR SLEEPING TOO MUCH: NOT AT ALL
SUM OF ALL RESPONSES TO PHQ9 QUESTIONS 1 AND 2: 2
4. FEELING TIRED OR HAVING LITTLE ENERGY: SEVERAL DAYS
5. POOR APPETITE OR OVEREATING: NOT AT ALL
9. THOUGHTS THAT YOU WOULD BE BETTER OFF DEAD, OR OF HURTING YOURSELF: NOT AT ALL

## 2022-06-07 ASSESSMENT — ENCOUNTER SYMPTOMS
COUGH: 0
SHORTNESS OF BREATH: 0
WEIGHT LOSS: 1
TREMORS: 1

## 2022-06-07 ASSESSMENT — PAIN DESCRIPTION - PAIN TYPE
TYPE: ACUTE PAIN;CHRONIC PAIN
TYPE: ACUTE PAIN

## 2022-06-07 ASSESSMENT — ACTIVITIES OF DAILY LIVING (ADL): TOILETING: INDEPENDENT

## 2022-06-07 ASSESSMENT — FIBROSIS 4 INDEX: FIB4 SCORE: 1.53

## 2022-06-07 NOTE — WOUND TEAM
Wound team consult for patient's Sacrum.  Upon assessment skin is intact.  New clinical picture taken.  Patient is continent.  There is a little discoloration from most likely venous pooling. New orders placed for wound prevention protocol initiated.  No advance wound care needed at this time.  Please re-consult for any skin concerns.  Thank you
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
## 2022-06-07 NOTE — PROGRESS NOTES
Report given to Buddy HACKETT who will be assuming care for patient on T-[id number]. Patient transferring via wheelchair on 2L NC. Vital signs stable. Transferring with purse, cell phone, chargers, tablet, dentures, and CPAP machine.

## 2022-06-07 NOTE — CARE PLAN
The patient is Watcher - Medium risk of patient condition declining or worsening    Shift Goals  Clinical Goals: stable vs  Patient Goals: sleep  Family Goals: mikel    Progress made toward(s) clinical / shift goals:      Problem: Knowledge Deficit - Standard  Goal: Patient and family/care givers will demonstrate understanding of plan of care, disease process/condition, diagnostic tests and medications  Outcome: Progressing     Problem: Hemodynamics  Goal: Patient's hemodynamics, fluid balance and neurologic status will be stable or improve  Outcome: Progressing       Patient is not progressing towards the following goals:

## 2022-06-07 NOTE — PROGRESS NOTES
4 Eyes Skin Assessment Completed by LEW hope and LEW benitez.    Head WDL  Ears WDL  Nose WDL  Mouth WDL  Neck WDL  Breast/Chest WDLscab  Shoulder Blades WDL  Spine WDL  (R) Arm/Elbow/Hand WDLscab  (L) Arm/Elbow/Hand WDLscab  Abdomen WDL  Groin WDL  Scrotum/Coccyx/Buttocks WDLredness  (R) Leg WDLedema   (L) Leg WDLedema  (R) Heel/Foot/Toe WDLredness  (L) Heel/Foot/Toe WDLredness          Devices In Places Tele Box, Blood Pressure Cuff and Pulse Ox      Interventions In Place NC W/Ear Foams    Possible Skin Injury No    Pictures Uploaded Into Epic N/A  Wound Consult Placed N/A  RN Wound Prevention Protocol Ordered No

## 2022-06-07 NOTE — DISCHARGE PLANNING
LSW attempted to meet with pt at bedside to complete assessment and verify DC plan. Pt currently on the phone with a  and requested this LSW come back later.

## 2022-06-07 NOTE — PROGRESS NOTES
Pt is A XOX4.Pain scale 3/10 back pain Pt says pain is subsiding after the Oxycodone she took.Pt educated on importance of ambulating with help and should sit in the chair for meals and importance of pain control.Pt verbalized  Understanding.Call light is within reach. Bed is in a low and locked position

## 2022-06-08 ENCOUNTER — PHARMACY VISIT (OUTPATIENT)
Dept: PHARMACY | Facility: MEDICAL CENTER | Age: 67
End: 2022-06-08
Payer: COMMERCIAL

## 2022-06-08 VITALS
TEMPERATURE: 97.4 F | RESPIRATION RATE: 16 BRPM | DIASTOLIC BLOOD PRESSURE: 83 MMHG | SYSTOLIC BLOOD PRESSURE: 158 MMHG | WEIGHT: 242.51 LBS | HEIGHT: 64 IN | BODY MASS INDEX: 41.4 KG/M2 | OXYGEN SATURATION: 91 % | HEART RATE: 89 BPM

## 2022-06-08 PROCEDURE — 700102 HCHG RX REV CODE 250 W/ 637 OVERRIDE(OP): Performed by: STUDENT IN AN ORGANIZED HEALTH CARE EDUCATION/TRAINING PROGRAM

## 2022-06-08 PROCEDURE — 99239 HOSP IP/OBS DSCHRG MGMT >30: CPT | Performed by: STUDENT IN AN ORGANIZED HEALTH CARE EDUCATION/TRAINING PROGRAM

## 2022-06-08 PROCEDURE — RXMED WILLOW AMBULATORY MEDICATION CHARGE: Performed by: STUDENT IN AN ORGANIZED HEALTH CARE EDUCATION/TRAINING PROGRAM

## 2022-06-08 PROCEDURE — 700102 HCHG RX REV CODE 250 W/ 637 OVERRIDE(OP): Performed by: HOSPITALIST

## 2022-06-08 PROCEDURE — 94660 CPAP INITIATION&MGMT: CPT

## 2022-06-08 PROCEDURE — A9270 NON-COVERED ITEM OR SERVICE: HCPCS | Performed by: STUDENT IN AN ORGANIZED HEALTH CARE EDUCATION/TRAINING PROGRAM

## 2022-06-08 PROCEDURE — A9270 NON-COVERED ITEM OR SERVICE: HCPCS | Performed by: HOSPITALIST

## 2022-06-08 PROCEDURE — 700111 HCHG RX REV CODE 636 W/ 250 OVERRIDE (IP): Performed by: STUDENT IN AN ORGANIZED HEALTH CARE EDUCATION/TRAINING PROGRAM

## 2022-06-08 PROCEDURE — 97162 PT EVAL MOD COMPLEX 30 MIN: CPT

## 2022-06-08 RX ORDER — ATORVASTATIN CALCIUM 40 MG/1
40 TABLET, FILM COATED ORAL EVERY EVENING
Qty: 30 TABLET | Refills: 3 | Status: SHIPPED | OUTPATIENT
Start: 2022-06-08

## 2022-06-08 RX ORDER — ISOSORBIDE MONONITRATE 30 MG/1
120 TABLET, EXTENDED RELEASE ORAL DAILY
Status: DISCONTINUED | OUTPATIENT
Start: 2022-06-09 | End: 2022-06-08 | Stop reason: HOSPADM

## 2022-06-08 RX ORDER — LEVOTHYROXINE SODIUM 0.1 MG/1
100 TABLET ORAL DAILY
Qty: 30 TABLET | Refills: 3 | Status: SHIPPED | OUTPATIENT
Start: 2022-06-08

## 2022-06-08 RX ORDER — BUDESONIDE AND FORMOTEROL FUMARATE DIHYDRATE 160; 4.5 UG/1; UG/1
2 AEROSOL RESPIRATORY (INHALATION) 2 TIMES DAILY
Qty: 10.2 G | Refills: 3 | Status: SHIPPED | OUTPATIENT
Start: 2022-06-08

## 2022-06-08 RX ORDER — SERTRALINE HYDROCHLORIDE 100 MG/1
100 TABLET, FILM COATED ORAL DAILY
Qty: 30 TABLET | Refills: 11 | Status: SHIPPED | OUTPATIENT
Start: 2022-06-08

## 2022-06-08 RX ORDER — OMEPRAZOLE 20 MG/1
20 CAPSULE, DELAYED RELEASE ORAL 2 TIMES DAILY
Qty: 30 CAPSULE | Refills: 3 | Status: SHIPPED | OUTPATIENT
Start: 2022-06-08

## 2022-06-08 RX ORDER — PROPRANOLOL HCL 60 MG
60 CAPSULE, EXTENDED RELEASE 24HR ORAL DAILY
Qty: 30 CAPSULE | Refills: 11 | Status: SHIPPED | OUTPATIENT
Start: 2022-06-08 | End: 2022-10-20

## 2022-06-08 RX ORDER — ISOSORBIDE MONONITRATE 30 MG/1
60 TABLET, EXTENDED RELEASE ORAL ONCE
Status: COMPLETED | OUTPATIENT
Start: 2022-06-08 | End: 2022-06-08

## 2022-06-08 RX ORDER — ASPIRIN 81 MG/1
81 TABLET ORAL DAILY
Qty: 30 TABLET | Refills: 3 | Status: SHIPPED | OUTPATIENT
Start: 2022-06-09

## 2022-06-08 RX ORDER — ISOSORBIDE MONONITRATE 60 MG/1
120 TABLET, EXTENDED RELEASE ORAL DAILY
Qty: 30 TABLET | Refills: 3 | Status: SHIPPED | OUTPATIENT
Start: 2022-06-09

## 2022-06-08 RX ORDER — ISOSORBIDE MONONITRATE 60 MG/1
120 TABLET, EXTENDED RELEASE ORAL DAILY
Qty: 30 TABLET | Refills: 3 | Status: SHIPPED | OUTPATIENT
Start: 2022-06-09 | End: 2022-06-08 | Stop reason: SDUPTHER

## 2022-06-08 RX ORDER — AMLODIPINE BESYLATE 10 MG/1
10 TABLET ORAL
Status: DISCONTINUED | OUTPATIENT
Start: 2022-06-08 | End: 2022-06-08

## 2022-06-08 RX ORDER — BENAZEPRIL HYDROCHLORIDE 40 MG/1
40 TABLET ORAL DAILY
Qty: 30 TABLET | Refills: 3 | Status: SHIPPED | OUTPATIENT
Start: 2022-06-09

## 2022-06-08 RX ORDER — ISOSORBIDE MONONITRATE 30 MG/1
30 TABLET, EXTENDED RELEASE ORAL DAILY
Qty: 30 TABLET | Refills: 11 | Status: SHIPPED | OUTPATIENT
Start: 2022-06-08 | End: 2022-06-08

## 2022-06-08 RX ADMIN — BENAZEPRIL HYDROCHLORIDE 40 MG: 20 TABLET ORAL at 06:46

## 2022-06-08 RX ADMIN — LEVOTHYROXINE SODIUM 100 MCG: 0.1 TABLET ORAL at 06:47

## 2022-06-08 RX ADMIN — SERTRALINE 100 MG: 100 TABLET, FILM COATED ORAL at 06:47

## 2022-06-08 RX ADMIN — ISOSORBIDE MONONITRATE 60 MG: 30 TABLET, EXTENDED RELEASE ORAL at 10:00

## 2022-06-08 RX ADMIN — PROPRANOLOL HYDROCHLORIDE 60 MG: 60 CAPSULE, EXTENDED RELEASE ORAL at 06:46

## 2022-06-08 RX ADMIN — ACETAMINOPHEN 650 MG: 325 TABLET ORAL at 00:51

## 2022-06-08 RX ADMIN — BUDESONIDE AND FORMOTEROL FUMARATE DIHYDRATE 2 PUFF: 160; 4.5 AEROSOL RESPIRATORY (INHALATION) at 06:00

## 2022-06-08 RX ADMIN — OMEPRAZOLE 20 MG: 20 CAPSULE, DELAYED RELEASE ORAL at 06:46

## 2022-06-08 RX ADMIN — OXYCODONE HYDROCHLORIDE 10 MG: 10 TABLET ORAL at 00:14

## 2022-06-08 RX ADMIN — HYDRALAZINE HYDROCHLORIDE 10 MG: 20 INJECTION INTRAMUSCULAR; INTRAVENOUS at 00:47

## 2022-06-08 RX ADMIN — ISOSORBIDE MONONITRATE 60 MG: 30 TABLET, EXTENDED RELEASE ORAL at 06:46

## 2022-06-08 RX ADMIN — ASPIRIN 81 MG: 81 TABLET, COATED ORAL at 06:46

## 2022-06-08 ASSESSMENT — GAIT ASSESSMENTS
GAIT LEVEL OF ASSIST: SUPERVISED
DISTANCE (FEET): 100
ASSISTIVE DEVICE: FRONT WHEEL WALKER

## 2022-06-08 ASSESSMENT — COGNITIVE AND FUNCTIONAL STATUS - GENERAL
SUGGESTED CMS G CODE MODIFIER MOBILITY: CJ
MOBILITY SCORE: 22
CLIMB 3 TO 5 STEPS WITH RAILING: A LITTLE
WALKING IN HOSPITAL ROOM: A LITTLE

## 2022-06-08 ASSESSMENT — PAIN DESCRIPTION - PAIN TYPE: TYPE: CHRONIC PAIN

## 2022-06-08 NOTE — CARE PLAN
The patient is Watcher - Medium risk of patient condition declining or worsening    Shift Goals  Clinical Goals: Remain hemodynamically stable, monitor VS, pain management  Patient Goals: Pain control, sleep  Family Goals: GARO. Not present    Progress made toward(s) clinical / shift goals:      Problem: Hemodynamics  Goal: Patient's hemodynamics, fluid balance and neurologic status will be stable or improve  Outcome: Progressing     Problem: Fluid Volume  Goal: Fluid volume balance will be maintained  Outcome: Progressing     Problem: Respiratory  Goal: Patient will achieve/maintain optimum respiratory ventilation and gas exchange  Outcome: Progressing     Problem: Fall Risk  Goal: Patient will remain free from falls  Outcome: Progressing       Patient is not progressing towards the following goals: NA

## 2022-06-08 NOTE — PROGRESS NOTES
Pt has been Sinus Rhythm on telemetry.Heart rate 69-85.PR0.16 QRS       0.08 QT 0.43.  Ectopy:PVCs,PAS and Couplets.

## 2022-06-08 NOTE — PROGRESS NOTES
Bedside report received and patient care assumed. Patient is resting in bed, A&OX4, with no complaints of pain and is on 1LNC. Tele box on. All fall precautions are in place, belongings at bedside table. Pt was updated on POC, no questions or concerns. Pt educated on use of call light for assistance.

## 2022-06-08 NOTE — DISCHARGE SUMMARY
"Discharge Summary    CHIEF COMPLAINT ON ADMISSION  No chief complaint on file.      Reason for Admission  acute renal failure     Admission Date  6/3/2022    CODE STATUS  Prior    HPI & HOSPITAL COURSE  This is a 67 y.o. female here with weakness  Ms. Marx is a 67 y.o. female with history of CHF, CAD s/p CABG, CKD III, COPD, prior DVT/PE 30 years ago now off Warfarin for 3months  who presented 6/3/2022 [facility] (referred from outpatient PCP), found to have septic shock, transferred to [facility] as direct admit for higher level of care.  Patient reported having abdominal cramping for the past 3 weeks, reported decreased urine output which she describes as \"spoting.\"  She does endorse dysuria hematuria, generalized weakness associated with subjective chills.  She was seen at PCP 6/2, sent to [place] ER for evaluation, subsequently sent to [facility] as direct admit for higher level of care, needing nephrology evaluation.    Patient was evaluated and worked up for marked hypovolemia in addition to acute renal failure with acute tubular necrosis.  Nephrology was placed on consultation provided patient with bicarb drip which she responded to favorably.  Additionally because of hypotension despite fluid resuscitation patient did require vasopressor agents in the form of Levophed to maintain mean arterial pressure greater than 65.  She was subsequently transitioned off of vasopressors had improving renal function with creatinine downtrending to 10 near normal baseline.  Patient had adequate urinary output upon day of discharge.  Furthermore patient was discharged in the care of of a good friend of hers who she will be staying with until she moves to [place].  Patient was provided with all appropriate medications prior to discharge.  Lastly, patient was worked up and evaluated for pulmonary embolism patient had VQ scan performed because patient cannot tolerate IV contrast secondary to acute " renal failure and acute tubular necrosis.  Patient had low probability of pulmonary embolism via VQ scan results.      Therefore, she is discharged in good and stable condition to home with close outpatient follow-up.    The patient met 2-midnight criteria for an inpatient stay at the time of discharge.    Discharge Date  6/8/2022    FOLLOW UP ITEMS POST DISCHARGE  Take all medication as prescribed  Go to all follow-up appointments as indicated    DISCHARGE DIAGNOSES  Principal Problem:    Shock (HCC) POA: Unknown  Active Problems:    Obesity, morbid (HCC) POA: Yes      Overview: IMO load March 2020    Hx of CABG POA: Yes      Overview: ICD-10 transition    HTN (hypertension) POA: Yes    Dyslipidemia POA: Yes    Acute kidney injury superimposed on chronic kidney disease (HCC) POA: Unknown    History of DVT (deep vein thrombosis) POA: Yes    ACP (advance care planning) POA: Yes    Acute on chronic respiratory failure with hypoxia (HCC) POA: Yes    Acute cystitis with hematuria POA: Yes    Hematuria POA: Yes    COPD (chronic obstructive pulmonary disease) ([facility]) POA: Yes    Weight loss, unintentional POA: Yes    LALITHA (acute kidney injury) ([facility]) POA: Yes  Resolved Problems:    VTE (venous thromboembolism) POA: Unknown      FOLLOW UP    STEVIE De Anda  [address] [facility] [id number] [phone]    Schedule an appointment as soon as possible for a visit in 1 week(s)  Hospital follow-up      MEDICATIONS ON DISCHARGE     Medication List      START taking these medications      Instructions   Aspirin Low Dose 81 MG EC tablet  Start taking on: June 9, 2022  Generic drug: aspirin  Replaces: aspirin 81 MG tablet   Take 1 Tablet by mouth every day.  Dose: 81 mg     atorvastatin 40 MG Tabs  Commonly known as: LIPITOR   Take 1 Tablet by mouth every evening.  Dose: 40 mg     omeprazole 20 MG delayed-release capsule  Commonly known as: PRILOSEC   Take 1 Capsule by mouth 2 times a day.  Dose: 20 mg         CHANGE how you take these medications      Instructions   benazepril 40 MG tablet  Start taking on: June 9, 2022  What changed: additional instructions  Commonly known as: LOTENSIN   Take 1 Tablet by mouth every day.  Dose: 40 mg     isosorbide mononitrate SR 60 MG Tb24  Start taking on: June 9, 2022  What changed:   · medication strength  · how much to take  Commonly known as: IMDUR   Take 2 Tablets by mouth every day.  Dose: 120 mg     Symbicort 160-4.5 MCG/ACT Aero  What changed: additional instructions  Generic drug: budesonide-formoterol   Inhale 2 Puffs 2 times a day.  Dose: 2 Puff        CONTINUE taking these medications      Instructions   ARIPiprazole 10 MG Tabs  Commonly known as: Abilify   Take 10 mg by mouth every day.  Dose: 10 mg     buPROPion 200 MG SR tablet  Commonly known as: WELLBUTRIN SR   Take 200 mg by mouth 2 times a day.  Dose: 200 mg     cyclobenzaprine 10 mg Tabs  Commonly known as: Flexeril   Take 10 mg by mouth 3 times a day as needed.  Dose: 10 mg     levothyroxine 100 MCG Tabs  Commonly known as: SYNTHROID   Take 1 Tablet by mouth every day.  Dose: 100 mcg     oxyCODONE-acetaminophen  MG Tabs  Commonly known as: PERCOCET-10   Take 1 Tab by mouth every 6 hours as needed for Severe Pain.  Dose: 1 Tablet     propranolol LA 60 MG Cp24  Commonly known as: INDERAL LA   Take 1 Capsule by mouth every day.  Dose: 60 mg     QUEtiapine 25 MG Tabs  Commonly known as: Seroquel   Take 25-50 mg by mouth 2 times a day. 25 mg in the AM  50 mg in the PM  Dose: 25-50 mg     sertraline 100 MG Tabs  Commonly known as: Zoloft   Take 1 Tablet by mouth every day.  Dose: 100 mg     topiramate 50 MG tablet  Commonly known as: TOPAMAX   Take 50 mg by mouth every day.  Dose: 50 mg        STOP taking these medications    aspirin 81 MG tablet  Replaced by: Aspirin Low Dose 81 MG EC tablet     mirtazapine 15 MG Tabs  Commonly known as: Remeron     pantoprazole 40 MG Tbec  Commonly known as: PROTONIX      warfarin 2 MG Tabs  Commonly known as: COUMADIN            Allergies  Allergies   Allergen Reactions   • Strawberry Unspecified     Bumps on tongue and throat swelling   • Codeine Vomiting and Nausea       DIET  No orders of the defined types were placed in this encounter.      ACTIVITY  As tolerated.  Weight bearing as tolerated    CONSULTATIONS  Nephrology  Intensivist    PROCEDURES  None    LABORATORY  Lab Results   Component Value Date    SODIUM 144 06/07/2022    POTASSIUM 4.0 06/07/2022    CHLORIDE 109 06/07/2022    CO2 25 06/07/2022    GLUCOSE 95 06/07/2022    BUN 11 06/07/2022    CREATININE 1.04 06/07/2022    CREATININE 1.0 04/14/2006        Lab Results   Component Value Date    WBC 8.5 06/05/2022    HEMOGLOBIN 10.5 (L) 06/05/2022    HEMATOCRIT 32.0 (L) 06/05/2022    PLATELETCT 203 06/05/2022        Total time of the discharge process exceeds 35 minutes.

## 2022-06-08 NOTE — DISCHARGE INSTRUCTIONS
Discharge Instructions    Discharged to home by car with relative. Discharged via wheelchair, hospital escort: Yes.  Special equipment needed: Not Applicable    Be sure to schedule a follow-up appointment with your primary care doctor or any specialists as instructed.     Discharge Plan:   Diet Plan: Discussed  Activity Level: Discussed  Confirmed Follow up Appointment: Patient to Call and Schedule Appointment  Confirmed Symptoms Management: Discussed  Medication Reconciliation Updated: Yes    I understand that a diet low in cholesterol, fat, and sodium is recommended for good health. Unless I have been given specific instructions below for another diet, I accept this instruction as my diet prescription.   Other diet: Regular    Special Instructions: Sepsis, Diagnosis, Adult  Sepsis is a serious bodily reaction to an infection. The infection that triggers sepsis may be from a bacteria, virus, or fungus. Sepsis can result from an infection in any part of your body. Infections that commonly lead to sepsis include skin, lung, and urinary tract infections.  Sepsis is a medical emergency that must be treated right away in a hospital. In severe cases, it can lead to septic shock. Septic shock can weaken your heart and cause your blood pressure to drop. This can cause your central nervous system and your body's organs to stop working.  What are the causes?  This condition is caused by a severe reaction to infections from bacteria, viruses, or fungus. The germs that most often lead to sepsis include:  Escherichia coli (E. coli) bacteria.  Staphylococcus aureus (staph) bacteria.  Some types of Streptococcus bacteria.  The most common infections affect these organs:  The lung (pneumonia).  The kidneys or bladder (urinary tract infection).  The skin (cellulitis).  The bowel, gallbladder, or pancreas.  What increases the risk?  You are more likely to develop this condition if:  Your body's disease-fighting system (immune  system) is weakened.  You are age 65 or older.  You are male.  You had surgery or you have been hospitalized.  You have these devices inserted into your body:  A small, thin tube (catheter).  IV line.  Breathing tube.  Drainage tube.  You are not getting enough nutrients from food (malnourished).  You have a long-term (chronic) disease, such as cancer, lung disease, kidney disease, or diabetes.  You are .  What are the signs or symptoms?  Symptoms of this condition may include:  Fever.  Chills or feeling very cold.  Confusion or anxiety.  Fatigue.  Muscle aches.  Shortness of breath.  Nausea and vomiting.  Urinating much less than usual.  Fast heart rate (tachycardia).  Rapid breathing (hyperventilation).  Changes in skin color. Your skin may look blotchy, pale, or blue.  Cool, clammy, or sweaty skin.  Skin rash.  Other symptoms depend on the source of your infection.  How is this diagnosed?  This condition is diagnosed based on:  Your symptoms.  Your medical history.  A physical exam.  Other tests may also be done to find out the cause of the infection and how severe the sepsis is. These tests may include:  Blood tests.  Urine tests.  Swabs from other areas of your body that may have an infection. These samples may be tested (cultured) to find out what type of bacteria is causing the infection.  Chest X-ray to check for pneumonia. Other imaging tests, such as a CT scan, may also be done.  Lumbar puncture. This removes a small amount of the fluid that surrounds your brain and spinal cord. The fluid is then examined for infection.  How is this treated?  This condition must be treated in a hospital. Based on the cause of your infection, you may be given an antibiotic, antiviral, or antifungal medicine.  You may also receive:  Fluids through an IV.  Oxygen and breathing assistance.  Medicines to increase your blood pressure.  Kidney dialysis. This process cleans your blood if your kidneys have  failed.  Surgery to remove infected tissue.  Blood transfusion if needed.  Medicine to prevent blood clots.  Nutrients to correct imbalances in basic body function (metabolism). You may:  Receive important salts and minerals (electrolytes) through an IV.  Have your blood sugar level adjusted.  Follow these instructions at home:  Medicines    Take over-the-counter and prescription medicines only as told by your health care provider.  If you were prescribed an antibiotic, antiviral, or antifungal medicine, take it as told by your health care provider. Do not stop taking the medicine even if you start to feel better.  General instructions  If you have a catheter or other indwelling device, ask to have it removed as soon as possible.  Keep all follow-up visits as told by your health care provider. This is important.  Contact a health care provider if:  You do not feel like you are getting better or regaining strength.  You are having trouble coping with your recovery.  You frequently feel tired.  You feel worse or do not seem to get better after surgery.  You think you may have an infection after surgery.  Get help right away if:  You have any symptoms of sepsis.  You have difficulty breathing.  You have a rapid or skipping heartbeat.  You become confused or disoriented.  You have a high fever.  Your skin becomes blotchy, pale, or blue.  You have an infection that is getting worse or not getting better.  These symptoms may represent a serious problem that is an emergency. Do not wait to see if the symptoms will go away. Get medical help right away. Call your local emergency services (911 in the [place] Do not drive yourself to the hospital.  Summary  Sepsis is a medical emergency that requires immediate treatment in a hospital.  This condition is caused by a severe reaction to infections from bacteria, viruses, or fungus.  Based on the cause of your infection, you may be given an antibiotic, antiviral, or antifungal  medicine.  Treatment may also include IV fluids, breathing assistance, and kidney dialysis.  This information is not intended to replace advice given to you by your health care provider. Make sure you discuss any questions you have with your health care provider.  Document Released: 09/15/2004 Document Revised: 07/26/2019 Document Reviewed: 07/26/2019  [employer] Patient Education © 2020 [employer]    Is patient discharged on Warfarin / Coumadin?   No     Depression / Suicide Risk    As you are discharged from this [facility] facility, it is important to learn how to keep safe from harming yourself.    Recognize the warning signs:  Abrupt changes in personality, positive or negative- including increase in energy   Giving away possessions  Change in eating patterns- significant weight changes-  positive or negative  Change in sleeping patterns- unable to sleep or sleeping all the time   Unwillingness or inability to communicate  Depression  Unusual sadness, discouragement and loneliness  Talk of wanting to die  Neglect of personal appearance   Rebelliousness- reckless behavior  Withdrawal from people/activities they love  Confusion- inability to concentrate     If you or a loved one observes any of these behaviors or has concerns about self-harm, here's what you can do:  Talk about it- your feelings and reasons for harming yourself  Remove any means that you might use to hurt yourself (examples: pills, rope, extension cords, firearm)  Get professional help from the community (Mental Health, Substance Abuse, psychological counseling)  Do not be alone:Call your Safe Contact- someone whom you trust who will be there for you.  Call your local CRISIS HOTLINE 692-5542 or 335-803-4500  Call your local Children's Mobile Crisis Response Team [place] (116) 215-6848 or www.Interse  Call the toll free National Suicide Prevention Hotlines   National Suicide Prevention Lifeline 983-340-QBZY (1727)  National [place]  Line Network 800-SUICIDE (697-6565)    Septic Shock  Septic shock is the final, most serious stage of the body's inflammatory response to an infection (sepsis). Sepsis happens when the chemicals that are produced by your body to fight infection cause inflammation through your entire body (systemic). This can lead to problems with your blood pressure that prevent your organs from getting the oxygen they need. Septic shock results when your organs begin to fail from the drop in blood pressure.  Infections that lead to sepsis often begin in the lungs, abdomen, urinary tract, reproductive system, or digestive system.  What are the causes?  Septic shock can result from any bacterial, fungal, or viral infection in the body. Septic shock from a viral infection is rare.  What increases the risk?  You may be more likely to develop septic shock from an infection if you:  Are very young or very old.  Have AIDS or another disease that weakens your body's defense system (immune system).  Have diabetes.  Have lymphoma or leukemia.  Have a disease of the lungs, intestines, reproductive system, or urinary tract.  Have been hospitalized for a long time, especially if you are using a catheter.  Had a recent surgery or medical procedure.  Have been taking antibiotic medicines or steroid medicines for a long time.  What are the signs or symptoms?  Signs and symptoms of septic shock may include:  Low blood pressure.  A rapid heart rate or heart palpitations.  Shortness of breath.  Rash or discolored skin.  A very high or very low body temperature with chills.  Reduced urine output.  Feeling lightheaded, weak, or confused.  Agitation or restlessness.  How is this diagnosed?  Your health care provider can diagnose septic shock based on your symptoms and your medical history. Your health care provider will also perform a physical exam. Tests that will be done to confirm the diagnosis may include:  Tests to check for infection by trying to  grow (culture) bacteria from samples of:  Blood.  Urine.  Brain and spinal fluid (cerebrospinal fluid or CSF).  Mucus.  Wound secretions.  Imaging tests, such as:  X-rays.  Ultrasound.  CT scans.  MRI.  How is this treated?  Septic shock is a medical emergency. Treatment takes place in a hospital, usually in the intensive care unit (ICU). You may be given antibiotics through an IV tube immediately. Other possible treatments include:  Medicine to increase blood pressure (vasopressor medicines).  Steroids to reduce inflammation.  IV fluids to help with symptoms of dehydration.  Respirators or breathing machines to support breathing.  Insulin to control blood sugar.  Surgery to clean wounds or remove infected or dead tissue. This is needed in some cases.  Dialysis.  Get help right away if:  Septic shock is a serious problem that is a medical emergency. Do not wait to see if the symptoms will go away. Get medical help right away. Call your local emergency services (911 in the [place] Do not drive yourself to the hospital.   This information is not intended to replace advice given to you by your health care provider. Make sure you discuss any questions you have with your health care provider.  Document Released: 08/21/2015 Document Revised: 05/25/2017 Document Reviewed: 07/21/2015  [employer] Interactive Patient Education © 2017 [employer]

## 2022-06-08 NOTE — THERAPY
Physical Therapy   Initial Evaluation     Patient Name: Johana Marx  Age:  67 y.o., Sex:  female  Medical Record #: [id number]  Today's Date: 6/8/2022     Precautions  Precautions: Fall Risk    Assessment  Patient is 67 y.o. female with a diagnosis of shock, sepsis, LALITHA, acute on chronic respiratory failure, acute cystitis, and noted PMH of CHF, CAD s/p CABG and DVT/PE. Pt appears and reports being at baseline level of function at this time, and further PT not indicated in the hospital setting (pt in agreement). Pt would benefit from f/u OP PT if available for endurance training.    Plan    Recommend Physical Therapy for Evaluation only.    DC Equipment Recommendations: None  Discharge Recommendations: Recommend outpatient physical therapy services to address higher level deficits       Subjective  Pt reported being at baseline level of function, and denied any mobility concerns at this time. Pt reported normally only walking short distances at baseline due to shortness of breath. Pt reported doing monthly crafts and book club as hobbies. Pt reported plan to go to brother's home in [place] upon D/C.      Objective     06/08/22 0816   Initial Contact Note    Initial Contact Note Order Received and Verified, Evaluation Only - Patient Does Not Require Further Acute Physical Therapy at this Time.  However, May Benefit from Post Acute Therapy for Higher Level Functional Deficits.   Precautions   Precautions Fall Risk   Vitals   Pulse (!) 130  (with activity)   Pulse Oximetry 94 %   O2 Delivery Device Silicone Nasal Cannula   Vitals Comments Pt on 1L pre/post session, but maintained adequate O2 sats on room air with activity   Pain   Pain Scales 0 to 10 Scale    Pain 0 - 10 Group   Location Back   Pain Rating Scale (NPRS) 3   Prior Living Situation   Bathroom Set up Walk In Shower   Equipment Owned 4-Wheel Walker;Tub / Shower Seat   Lives with - Patient's Self Care Capacity Spouse   Comments Per chart review, ongoing  domestic issues, and pt reports plan to D/C to brother's home in [place]. Pt reported using oxygen at night time only.   Prior Level of Functional Mobility   Bed Mobility Independent   Transfer Status Independent   Ambulation Independent   Distance Ambulation (Feet)   (household, short distances due to baseline shortness of breath)   Assistive Devices Used 4-Wheel Walker  (Pt reported using for quite some time due to balance issues)   History of Falls   History of Falls No   Cognition    Level of Consciousness Alert   Comments AOx4   Passive ROM Lower Body   Passive ROM Lower Body WDL   Active ROM Lower Body    Active ROM Lower Body  WDL   Strength Lower Body   Lower Body Strength  WDL   Comments grossly 4+/5, except 4/5 B hip flexion   Sensation Lower Body   Lower Extremity Sensation   WDL   Lower Body Muscle Tone   Lower Body Muscle Tone  WDL   Strength Upper Body   Upper Body Strength  WDL   Upper Body Muscle Tone   Upper Body Muscle Tone  WDL   Balance Assessment   Sitting Balance (Static) Good   Standing Balance (Static) Fair +   Gait Analysis   Gait Level Of Assist Supervised   Assistive Device Front Wheel Walker   Distance (Feet) 100   Deviation   (Decreased taylor, L compensated trendelenburg)   # of Stairs Climbed 0   Comments Pt reported baseline shortness of breath with activity   Bed Mobility    Supine to Sit Independent   Functional Mobility   Sit to Stand Independent   Bed, Chair, Wheelchair Transfer Independent   Toilet Transfers Independent   Transfer Method Stand Step  (FWW)   How much difficulty does the patient currently have...   Turning over in bed (including adjusting bedclothes, sheets and blankets)? 4   Sitting down on and standing up from a chair with arms (e.g., wheelchair, bedside commode, etc.) 4   Moving from lying on back to sitting on the side of the bed? 4   How much help from another person does the patient currently need...   Moving to and from a bed to a chair (including a  wheelchair)? 4   Need to walk in a hospital room? 3   Climbing 3-5 steps with a railing? 3   6 clicks Mobility Score 22   Problem List    Problems Impaired Ambulation   Anticipated Discharge Equipment and Recommendations   DC Equipment Recommendations None   Discharge Recommendations Recommend outpatient physical therapy services to address higher level deficits   Interdisciplinary Plan of Care Collaboration   IDT Collaboration with  Nursing   Patient Position at End of Therapy Seated;Chair Alarm On;Call Light within Reach;Tray Table within Reach   Session Information   Date / Session Number  6/8- eval only

## 2022-06-08 NOTE — HOSPITAL COURSE
"Ms. Marx is a 67 y.o. 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  Patient reported having abdominal cramping for the past 3 weeks, reported decreased urine output which she describes as \"spoting.\"  She does endorse dysuria hematuria, generalized weakness associated with subjective chills.  She was seen at PCP 6/2, sent to [place] ER for evaluation, subsequently sent to [facility] as direct admit for higher level of care, needing nephrology evaluation.  "

## 2022-06-08 NOTE — CARE PLAN
"  Problem: Knowledge Deficit - Standard  Goal: Patient and family/care givers will demonstrate understanding of plan of care, disease process/condition, diagnostic tests and medications  Outcome: Progressing     Problem: Hemodynamics  Goal: Patient's hemodynamics, fluid balance and neurologic status will be stable or improve  Outcome: Progressing     Problem: Urinary - Renal Perfusion  Goal: Ability to achieve and maintain adequate renal perfusion and functioning will improve  Outcome: Progressing     Problem: Fall Risk  Goal: Patient will remain free from falls  Outcome: Progressing   The patient is Stable - Low risk of patient condition declining or worsening    Shift Goals  Clinical Goals: Up to chair for all meals, ambulate 3x per shift  Patient Goals: \"Go home\"  Family Goals: GARO        "

## 2022-06-08 NOTE — PROGRESS NOTES
Discharge RN Note:  Discharge order received. Pt informed of discharge; ride home with friend confirmed. PIV and tele box removed. Discharge adeunge notified that pt is will be ready in 30-60 mins. Pending recheck of BP by primary RN and delivery of clothing items from Megan's closet. Meds to beds pending, pharmacy filling new medications.     1146: Per primary RN, pt now ready to go to [facility]. DC yvonne notified.

## 2022-06-22 NOTE — DOCUMENTATION QUERY
[facility]                                                                       Query Response Note      PATIENT:               HARESH KAUR  ACCT #:                  [id number]  MRN:                     [id number]  :                      1955  ADMIT DATE:       6/3/2022 5:17 PM  DISCH DATE:        2022 12:36 PM  RESPONDING  PROVIDER #:        [id number]           QUERY TEXT:    Shock is documented in the Medical Record.  Please clarify the following:    NOTE:  If an appropriate response is not listed below, please respond with a new note.    The patient's Clinical Indicators include:  Clinical indicators  H&P:  Shock (HCC)  Assessment & Plan  Septic vs hypovolemic shock  Pyuria, leukocytosis, hypotension, acute organ failure  S/p 7L IVF resuscitation, started on Leophed prior to transfer -- still on -- wean off as tolerated  Initiated on midodrine, wean off levophed  IVF  Abx: ceftriaxone  F/u UCx, BCx    PN :  * Shock (HCC)  Assessment & Plan  Septic vs hypovolemic shock  Pyuria, leukocytosis, hypotension, acute organ failure  S/p 7L IVF resuscitation, started on Leophed prior to transfer   Initiated on midodrine, weaned off levophed  Abx: ceftriaxone    DC Summary:  DISCHARGE DIAGNOSES  Principal Problem:    Shock (HCC) POA: Unknown    Per Consult: LALITHA, nonoliguric, already improving.  Creatinine peak 5.9 at outside hospital, improved to 2.8 today.  There is no acute need for dialysis.  Urinalysis is benign.  LALITHA likely from prerenal hypovolemia/sepsis.  Avoid nephrotoxins.  Check labs daily.    Risk Factors  Per DCS: Patient was evaluated and worked up for marked hypovolemia in addition to acute renal failure with acute tubular necrosis.  Nephrology was placed on consultation provided patient with bicarb drip which she responded to favorably.  Additionally because of hypotension despite fluid resuscitation patient did require  vasopressor agents in the form of Levophed to maintain mean arterial pressure greater than 65.  She was subsequently transitioned off of vasopressors had improving renal function with creatinine downtrending to 10 near normal baseline.  Patient had adequate urinary output upon day of discharge.      [facility] contact information  [email or web address]  Options provided:   -- Septic shock   -- Hypovolemic shock   -- Septic and hypovolemic shock   -- Unable to determine      Query created by: Kelsy Velez on 6/22/2022 5:48 AM    RESPONSE TEXT:    Septic and hypovolemic shock          Electronically signed by:  FRANCISCO STRONG MD 6/22/2022 7:06 AM